# Patient Record
Sex: FEMALE | Race: WHITE | NOT HISPANIC OR LATINO
[De-identification: names, ages, dates, MRNs, and addresses within clinical notes are randomized per-mention and may not be internally consistent; named-entity substitution may affect disease eponyms.]

---

## 2019-01-03 PROBLEM — Z00.00 ENCOUNTER FOR PREVENTIVE HEALTH EXAMINATION: Status: ACTIVE | Noted: 2019-01-03

## 2019-02-12 ENCOUNTER — APPOINTMENT (OUTPATIENT)
Dept: ORTHOPEDIC SURGERY | Facility: CLINIC | Age: 64
End: 2019-02-12
Payer: COMMERCIAL

## 2019-02-12 VITALS
BODY MASS INDEX: 27.48 KG/M2 | DIASTOLIC BLOOD PRESSURE: 87 MMHG | WEIGHT: 171 LBS | HEART RATE: 70 BPM | HEIGHT: 66 IN | SYSTOLIC BLOOD PRESSURE: 136 MMHG

## 2019-02-12 DIAGNOSIS — Z86.79 PERSONAL HISTORY OF OTHER DISEASES OF THE CIRCULATORY SYSTEM: ICD-10-CM

## 2019-02-12 DIAGNOSIS — Z80.9 FAMILY HISTORY OF MALIGNANT NEOPLASM, UNSPECIFIED: ICD-10-CM

## 2019-02-12 DIAGNOSIS — Z78.9 OTHER SPECIFIED HEALTH STATUS: ICD-10-CM

## 2019-02-12 DIAGNOSIS — Z86.39 PERSONAL HISTORY OF OTHER ENDOCRINE, NUTRITIONAL AND METABOLIC DISEASE: ICD-10-CM

## 2019-02-12 DIAGNOSIS — Z87.39 PERSONAL HISTORY OF OTHER DISEASES OF THE MUSCULOSKELETAL SYSTEM AND CONNECTIVE TISSUE: ICD-10-CM

## 2019-02-12 DIAGNOSIS — M16.11 UNILATERAL PRIMARY OSTEOARTHRITIS, RIGHT HIP: ICD-10-CM

## 2019-02-12 PROCEDURE — 99203 OFFICE O/P NEW LOW 30 MIN: CPT

## 2019-02-12 PROCEDURE — 73502 X-RAY EXAM HIP UNI 2-3 VIEWS: CPT

## 2019-04-09 ENCOUNTER — OUTPATIENT (OUTPATIENT)
Dept: OUTPATIENT SERVICES | Facility: HOSPITAL | Age: 64
LOS: 1 days | End: 2019-04-09
Payer: COMMERCIAL

## 2019-04-09 VITALS
DIASTOLIC BLOOD PRESSURE: 84 MMHG | RESPIRATION RATE: 14 BRPM | SYSTOLIC BLOOD PRESSURE: 126 MMHG | HEART RATE: 70 BPM | TEMPERATURE: 98 F | OXYGEN SATURATION: 99 % | WEIGHT: 167.55 LBS | HEIGHT: 66 IN

## 2019-04-09 DIAGNOSIS — Z98.51 TUBAL LIGATION STATUS: Chronic | ICD-10-CM

## 2019-04-09 DIAGNOSIS — Z91.09 OTHER ALLERGY STATUS, OTHER THAN TO DRUGS AND BIOLOGICAL SUBSTANCES: ICD-10-CM

## 2019-04-09 DIAGNOSIS — Z01.818 ENCOUNTER FOR OTHER PREPROCEDURAL EXAMINATION: ICD-10-CM

## 2019-04-09 DIAGNOSIS — M16.11 UNILATERAL PRIMARY OSTEOARTHRITIS, RIGHT HIP: ICD-10-CM

## 2019-04-09 DIAGNOSIS — M19.90 UNSPECIFIED OSTEOARTHRITIS, UNSPECIFIED SITE: ICD-10-CM

## 2019-04-09 LAB
ALBUMIN SERPL ELPH-MCNC: 4.1 G/DL — SIGNIFICANT CHANGE UP (ref 3.3–5)
ALP SERPL-CCNC: 59 U/L — SIGNIFICANT CHANGE UP (ref 30–120)
ALT FLD-CCNC: 22 U/L DA — SIGNIFICANT CHANGE UP (ref 10–60)
ANION GAP SERPL CALC-SCNC: 13 MMOL/L — SIGNIFICANT CHANGE UP (ref 5–17)
APTT BLD: 32.7 SEC — SIGNIFICANT CHANGE UP (ref 28.5–37)
AST SERPL-CCNC: 26 U/L — SIGNIFICANT CHANGE UP (ref 10–40)
BILIRUB SERPL-MCNC: 0.6 MG/DL — SIGNIFICANT CHANGE UP (ref 0.2–1.2)
BLD GP AB SCN SERPL QL: SIGNIFICANT CHANGE UP
BUN SERPL-MCNC: 26 MG/DL — HIGH (ref 7–23)
CALCIUM SERPL-MCNC: 9.3 MG/DL — SIGNIFICANT CHANGE UP (ref 8.4–10.5)
CHLORIDE SERPL-SCNC: 104 MMOL/L — SIGNIFICANT CHANGE UP (ref 96–108)
CO2 SERPL-SCNC: 28 MMOL/L — SIGNIFICANT CHANGE UP (ref 22–31)
CREAT SERPL-MCNC: 0.88 MG/DL — SIGNIFICANT CHANGE UP (ref 0.5–1.3)
GLUCOSE SERPL-MCNC: 96 MG/DL — SIGNIFICANT CHANGE UP (ref 70–99)
HCT VFR BLD CALC: 40.9 % — SIGNIFICANT CHANGE UP (ref 34.5–45)
HGB BLD-MCNC: 13.1 G/DL — SIGNIFICANT CHANGE UP (ref 11.5–15.5)
INR BLD: 1 RATIO — SIGNIFICANT CHANGE UP (ref 0.88–1.16)
MCHC RBC-ENTMCNC: 27.5 PG — SIGNIFICANT CHANGE UP (ref 27–34)
MCHC RBC-ENTMCNC: 32 GM/DL — SIGNIFICANT CHANGE UP (ref 32–36)
MCV RBC AUTO: 85.7 FL — SIGNIFICANT CHANGE UP (ref 80–100)
MRSA PCR RESULT.: SIGNIFICANT CHANGE UP
NRBC # BLD: 0 /100 WBCS — SIGNIFICANT CHANGE UP (ref 0–0)
PLATELET # BLD AUTO: 327 K/UL — SIGNIFICANT CHANGE UP (ref 150–400)
POTASSIUM SERPL-MCNC: 4.3 MMOL/L — SIGNIFICANT CHANGE UP (ref 3.5–5.3)
POTASSIUM SERPL-SCNC: 4.3 MMOL/L — SIGNIFICANT CHANGE UP (ref 3.5–5.3)
PROT SERPL-MCNC: 7.6 G/DL — SIGNIFICANT CHANGE UP (ref 6–8.3)
PROTHROM AB SERPL-ACNC: 10.9 SEC — SIGNIFICANT CHANGE UP (ref 10–12.9)
RBC # BLD: 4.77 M/UL — SIGNIFICANT CHANGE UP (ref 3.8–5.2)
RBC # FLD: 14.2 % — SIGNIFICANT CHANGE UP (ref 10.3–14.5)
S AUREUS DNA NOSE QL NAA+PROBE: SIGNIFICANT CHANGE UP
SODIUM SERPL-SCNC: 145 MMOL/L — SIGNIFICANT CHANGE UP (ref 135–145)
WBC # BLD: 6.89 K/UL — SIGNIFICANT CHANGE UP (ref 3.8–10.5)
WBC # FLD AUTO: 6.89 K/UL — SIGNIFICANT CHANGE UP (ref 3.8–10.5)

## 2019-04-09 PROCEDURE — 86900 BLOOD TYPING SEROLOGIC ABO: CPT

## 2019-04-09 PROCEDURE — 85730 THROMBOPLASTIN TIME PARTIAL: CPT

## 2019-04-09 PROCEDURE — 80053 COMPREHEN METABOLIC PANEL: CPT

## 2019-04-09 PROCEDURE — 85027 COMPLETE CBC AUTOMATED: CPT

## 2019-04-09 PROCEDURE — 85610 PROTHROMBIN TIME: CPT

## 2019-04-09 PROCEDURE — G0463: CPT

## 2019-04-09 PROCEDURE — 87641 MR-STAPH DNA AMP PROBE: CPT

## 2019-04-09 PROCEDURE — 86850 RBC ANTIBODY SCREEN: CPT

## 2019-04-09 PROCEDURE — 86901 BLOOD TYPING SEROLOGIC RH(D): CPT

## 2019-04-09 PROCEDURE — 36415 COLL VENOUS BLD VENIPUNCTURE: CPT

## 2019-04-09 PROCEDURE — 87640 STAPH A DNA AMP PROBE: CPT

## 2019-04-09 NOTE — H&P PST ADULT - NSICDXPASTMEDICALHX_GEN_ALL_CORE_FT
PAST MEDICAL HISTORY:  Essential hypertension     Hypothyroid     Osteoarthritis PAST MEDICAL HISTORY:  Essential hypertension     Hypothyroid     Osteoarthritis     Thyroid nodule Right

## 2019-04-09 NOTE — H&P PST ADULT - HISTORY OF PRESENT ILLNESS
This is a 62 y/o female who presents with longstanding history of progressively worsening right hip pain This is a 62 y/o female who presents with longstanding history of progressively worsening right hip pain with radiation to inner groin to right knee and limping  , Pain is  intermittent and worse pain with walking . Failed conservative measures Now presents to PST for right hip replacement on 4/29/19

## 2019-04-09 NOTE — H&P PST ADULT - OTHER CARE PROVIDERS
Dr Sheikh Jamul cardiology Dr Sheikh [place] cardiology  Dr Sheikh [place] cardiology , endocrinologist

## 2019-04-09 NOTE — H&P PST ADULT - NSICDXPROBLEM_GEN_ALL_CORE_FT
PROBLEM DIAGNOSES  Problem: H/O metal allergy  Assessment and Plan: Patient allergic to silver   left message for Gloria NP   notified OR rodriguez Yeimi     Problem: Osteoarthritis  Assessment and Plan: Right total hip replacement   Medical clearance   Cardiac clearance   Endocrinology clearance   Pre op instructions

## 2019-04-09 NOTE — H&P PST ADULT - RS GEN PE MLT RESP DETAILS PC
breath sounds equal/good air movement/no rhonchi/clear to auscultation bilaterally/respirations non-labored

## 2019-04-10 PROBLEM — E04.1 NONTOXIC SINGLE THYROID NODULE: Chronic | Status: ACTIVE | Noted: 2019-04-09

## 2019-04-25 RX ORDER — MAGNESIUM HYDROXIDE 400 MG/1
30 TABLET, CHEWABLE ORAL DAILY
Qty: 0 | Refills: 0 | Status: DISCONTINUED | OUTPATIENT
Start: 2019-04-29 | End: 2019-05-01

## 2019-04-25 RX ORDER — SENNA PLUS 8.6 MG/1
2 TABLET ORAL AT BEDTIME
Qty: 0 | Refills: 0 | Status: DISCONTINUED | OUTPATIENT
Start: 2019-04-29 | End: 2019-05-01

## 2019-04-25 RX ORDER — DOCUSATE SODIUM 100 MG
100 CAPSULE ORAL THREE TIMES A DAY
Qty: 0 | Refills: 0 | Status: DISCONTINUED | OUTPATIENT
Start: 2019-04-29 | End: 2019-05-01

## 2019-04-25 RX ORDER — SODIUM CHLORIDE 9 MG/ML
1000 INJECTION, SOLUTION INTRAVENOUS
Qty: 0 | Refills: 0 | Status: DISCONTINUED | OUTPATIENT
Start: 2019-04-29 | End: 2019-05-01

## 2019-04-25 RX ORDER — POLYETHYLENE GLYCOL 3350 17 G/17G
17 POWDER, FOR SOLUTION ORAL DAILY
Qty: 0 | Refills: 0 | Status: DISCONTINUED | OUTPATIENT
Start: 2019-04-29 | End: 2019-05-01

## 2019-04-25 RX ORDER — ONDANSETRON 8 MG/1
4 TABLET, FILM COATED ORAL EVERY 6 HOURS
Qty: 0 | Refills: 0 | Status: DISCONTINUED | OUTPATIENT
Start: 2019-04-29 | End: 2019-05-01

## 2019-04-25 RX ORDER — PANTOPRAZOLE SODIUM 20 MG/1
40 TABLET, DELAYED RELEASE ORAL
Qty: 0 | Refills: 0 | Status: DISCONTINUED | OUTPATIENT
Start: 2019-04-29 | End: 2019-05-01

## 2019-04-29 ENCOUNTER — INPATIENT (INPATIENT)
Facility: HOSPITAL | Age: 64
LOS: 1 days | Discharge: HOME | DRG: 470 | End: 2019-05-01
Attending: ORTHOPAEDIC SURGERY | Admitting: ORTHOPAEDIC SURGERY
Payer: COMMERCIAL

## 2019-04-29 ENCOUNTER — RESULT REVIEW (OUTPATIENT)
Age: 64
End: 2019-04-29

## 2019-04-29 ENCOUNTER — APPOINTMENT (OUTPATIENT)
Dept: ORTHOPEDIC SURGERY | Facility: HOSPITAL | Age: 64
End: 2019-04-29

## 2019-04-29 VITALS
SYSTOLIC BLOOD PRESSURE: 122 MMHG | WEIGHT: 167.99 LBS | TEMPERATURE: 99 F | DIASTOLIC BLOOD PRESSURE: 77 MMHG | HEIGHT: 67 IN | HEART RATE: 91 BPM | RESPIRATION RATE: 15 BRPM | OXYGEN SATURATION: 100 %

## 2019-04-29 DIAGNOSIS — Z98.51 TUBAL LIGATION STATUS: Chronic | ICD-10-CM

## 2019-04-29 DIAGNOSIS — E03.9 HYPOTHYROIDISM, UNSPECIFIED: ICD-10-CM

## 2019-04-29 DIAGNOSIS — I10 ESSENTIAL (PRIMARY) HYPERTENSION: ICD-10-CM

## 2019-04-29 DIAGNOSIS — M16.11 UNILATERAL PRIMARY OSTEOARTHRITIS, RIGHT HIP: ICD-10-CM

## 2019-04-29 PROBLEM — M19.90 UNSPECIFIED OSTEOARTHRITIS, UNSPECIFIED SITE: Chronic | Status: ACTIVE | Noted: 2019-04-09

## 2019-04-29 LAB
ANION GAP SERPL CALC-SCNC: 10 MMOL/L — SIGNIFICANT CHANGE UP (ref 5–17)
BUN SERPL-MCNC: 15 MG/DL — SIGNIFICANT CHANGE UP (ref 7–23)
CALCIUM SERPL-MCNC: 8.9 MG/DL — SIGNIFICANT CHANGE UP (ref 8.4–10.5)
CHLORIDE SERPL-SCNC: 100 MMOL/L — SIGNIFICANT CHANGE UP (ref 96–108)
CO2 SERPL-SCNC: 28 MMOL/L — SIGNIFICANT CHANGE UP (ref 22–31)
CREAT SERPL-MCNC: 0.8 MG/DL — SIGNIFICANT CHANGE UP (ref 0.5–1.3)
GLUCOSE SERPL-MCNC: 137 MG/DL — HIGH (ref 70–99)
HCT VFR BLD CALC: 37.3 % — SIGNIFICANT CHANGE UP (ref 34.5–45)
HGB BLD-MCNC: 12.2 G/DL — SIGNIFICANT CHANGE UP (ref 11.5–15.5)
MCHC RBC-ENTMCNC: 27.7 PG — SIGNIFICANT CHANGE UP (ref 27–34)
MCHC RBC-ENTMCNC: 32.7 GM/DL — SIGNIFICANT CHANGE UP (ref 32–36)
MCV RBC AUTO: 84.6 FL — SIGNIFICANT CHANGE UP (ref 80–100)
NRBC # BLD: 0 /100 WBCS — SIGNIFICANT CHANGE UP (ref 0–0)
PLATELET # BLD AUTO: 291 K/UL — SIGNIFICANT CHANGE UP (ref 150–400)
POTASSIUM SERPL-MCNC: 3.5 MMOL/L — SIGNIFICANT CHANGE UP (ref 3.5–5.3)
POTASSIUM SERPL-SCNC: 3.5 MMOL/L — SIGNIFICANT CHANGE UP (ref 3.5–5.3)
RBC # BLD: 4.41 M/UL — SIGNIFICANT CHANGE UP (ref 3.8–5.2)
RBC # FLD: 14 % — SIGNIFICANT CHANGE UP (ref 10.3–14.5)
SODIUM SERPL-SCNC: 138 MMOL/L — SIGNIFICANT CHANGE UP (ref 135–145)
WBC # BLD: 18.15 K/UL — HIGH (ref 3.8–10.5)
WBC # FLD AUTO: 18.15 K/UL — HIGH (ref 3.8–10.5)

## 2019-04-29 PROCEDURE — 99223 1ST HOSP IP/OBS HIGH 75: CPT

## 2019-04-29 PROCEDURE — 88305 TISSUE EXAM BY PATHOLOGIST: CPT | Mod: 26

## 2019-04-29 PROCEDURE — 27130 TOTAL HIP ARTHROPLASTY: CPT | Mod: RT

## 2019-04-29 PROCEDURE — 88311 DECALCIFY TISSUE: CPT | Mod: 26

## 2019-04-29 PROCEDURE — 73502 X-RAY EXAM HIP UNI 2-3 VIEWS: CPT | Mod: 26,RT

## 2019-04-29 PROCEDURE — 27130 TOTAL HIP ARTHROPLASTY: CPT | Mod: 82,RT

## 2019-04-29 RX ORDER — CEFAZOLIN SODIUM 1 G
2000 VIAL (EA) INJECTION ONCE
Qty: 0 | Refills: 0 | Status: COMPLETED | OUTPATIENT
Start: 2019-04-29 | End: 2019-04-29

## 2019-04-29 RX ORDER — OXYCODONE HYDROCHLORIDE 5 MG/1
5 TABLET ORAL
Qty: 0 | Refills: 0 | Status: DISCONTINUED | OUTPATIENT
Start: 2019-04-29 | End: 2019-05-01

## 2019-04-29 RX ORDER — ACETAMINOPHEN 500 MG
1000 TABLET ORAL EVERY 6 HOURS
Qty: 0 | Refills: 0 | Status: COMPLETED | OUTPATIENT
Start: 2019-04-29 | End: 2019-04-29

## 2019-04-29 RX ORDER — ASPIRIN/CALCIUM CARB/MAGNESIUM 324 MG
81 TABLET ORAL EVERY 12 HOURS
Qty: 0 | Refills: 0 | Status: DISCONTINUED | OUTPATIENT
Start: 2019-04-30 | End: 2019-05-01

## 2019-04-29 RX ORDER — CEFAZOLIN SODIUM 1 G
2000 VIAL (EA) INJECTION EVERY 8 HOURS
Qty: 0 | Refills: 0 | Status: COMPLETED | OUTPATIENT
Start: 2019-04-29 | End: 2019-04-30

## 2019-04-29 RX ORDER — SODIUM CHLORIDE 9 MG/ML
1000 INJECTION, SOLUTION INTRAVENOUS
Qty: 0 | Refills: 0 | Status: DISCONTINUED | OUTPATIENT
Start: 2019-04-29 | End: 2019-04-29

## 2019-04-29 RX ORDER — TRANEXAMIC ACID 100 MG/ML
1000 INJECTION, SOLUTION INTRAVENOUS ONCE
Qty: 0 | Refills: 0 | Status: COMPLETED | OUTPATIENT
Start: 2019-04-29 | End: 2019-04-29

## 2019-04-29 RX ORDER — APREPITANT 80 MG/1
40 CAPSULE ORAL ONCE
Qty: 0 | Refills: 0 | Status: COMPLETED | OUTPATIENT
Start: 2019-04-29 | End: 2019-04-29

## 2019-04-29 RX ORDER — HYDROMORPHONE HYDROCHLORIDE 2 MG/ML
0.5 INJECTION INTRAMUSCULAR; INTRAVENOUS; SUBCUTANEOUS
Qty: 0 | Refills: 0 | Status: DISCONTINUED | OUTPATIENT
Start: 2019-04-29 | End: 2019-04-29

## 2019-04-29 RX ORDER — HYDROMORPHONE HYDROCHLORIDE 2 MG/ML
0.5 INJECTION INTRAMUSCULAR; INTRAVENOUS; SUBCUTANEOUS
Qty: 0 | Refills: 0 | Status: DISCONTINUED | OUTPATIENT
Start: 2019-04-29 | End: 2019-05-01

## 2019-04-29 RX ORDER — CELECOXIB 200 MG/1
200 CAPSULE ORAL
Qty: 0 | Refills: 0 | Status: DISCONTINUED | OUTPATIENT
Start: 2019-04-29 | End: 2019-05-01

## 2019-04-29 RX ORDER — ACETAMINOPHEN 500 MG
1000 TABLET ORAL EVERY 8 HOURS
Qty: 0 | Refills: 0 | Status: DISCONTINUED | OUTPATIENT
Start: 2019-04-30 | End: 2019-05-01

## 2019-04-29 RX ORDER — CHLORHEXIDINE GLUCONATE 213 G/1000ML
1 SOLUTION TOPICAL ONCE
Qty: 0 | Refills: 0 | Status: COMPLETED | OUTPATIENT
Start: 2019-04-29 | End: 2019-04-29

## 2019-04-29 RX ORDER — ACETAMINOPHEN 500 MG
1000 TABLET ORAL ONCE
Qty: 0 | Refills: 0 | Status: COMPLETED | OUTPATIENT
Start: 2019-04-29 | End: 2019-04-29

## 2019-04-29 RX ORDER — ONDANSETRON 8 MG/1
4 TABLET, FILM COATED ORAL ONCE
Qty: 0 | Refills: 0 | Status: DISCONTINUED | OUTPATIENT
Start: 2019-04-29 | End: 2019-04-29

## 2019-04-29 RX ORDER — LOSARTAN POTASSIUM 100 MG/1
100 TABLET, FILM COATED ORAL DAILY
Qty: 0 | Refills: 0 | Status: DISCONTINUED | OUTPATIENT
Start: 2019-05-01 | End: 2019-05-01

## 2019-04-29 RX ORDER — LEVOTHYROXINE SODIUM 125 MCG
50 TABLET ORAL DAILY
Qty: 0 | Refills: 0 | Status: DISCONTINUED | OUTPATIENT
Start: 2019-04-29 | End: 2019-05-01

## 2019-04-29 RX ORDER — OXYCODONE HYDROCHLORIDE 5 MG/1
10 TABLET ORAL
Qty: 0 | Refills: 0 | Status: DISCONTINUED | OUTPATIENT
Start: 2019-04-29 | End: 2019-05-01

## 2019-04-29 RX ADMIN — APREPITANT 40 MILLIGRAM(S): 80 CAPSULE ORAL at 08:32

## 2019-04-29 RX ADMIN — Medication 100 MILLIGRAM(S): at 21:28

## 2019-04-29 RX ADMIN — CELECOXIB 200 MILLIGRAM(S): 200 CAPSULE ORAL at 21:28

## 2019-04-29 RX ADMIN — OXYCODONE HYDROCHLORIDE 10 MILLIGRAM(S): 5 TABLET ORAL at 21:28

## 2019-04-29 RX ADMIN — Medication 1000 MILLIGRAM(S): at 17:55

## 2019-04-29 RX ADMIN — SODIUM CHLORIDE 75 MILLILITER(S): 9 INJECTION, SOLUTION INTRAVENOUS at 12:42

## 2019-04-29 RX ADMIN — OXYCODONE HYDROCHLORIDE 5 MILLIGRAM(S): 5 TABLET ORAL at 18:28

## 2019-04-29 RX ADMIN — Medication 100 MILLIGRAM(S): at 18:01

## 2019-04-29 RX ADMIN — Medication 400 MILLIGRAM(S): at 23:00

## 2019-04-29 RX ADMIN — HYDROMORPHONE HYDROCHLORIDE 0.5 MILLIGRAM(S): 2 INJECTION INTRAMUSCULAR; INTRAVENOUS; SUBCUTANEOUS at 14:00

## 2019-04-29 RX ADMIN — SENNA PLUS 2 TABLET(S): 8.6 TABLET ORAL at 21:28

## 2019-04-29 RX ADMIN — Medication 400 MILLIGRAM(S): at 17:23

## 2019-04-29 RX ADMIN — CHLORHEXIDINE GLUCONATE 1 APPLICATION(S): 213 SOLUTION TOPICAL at 08:33

## 2019-04-29 RX ADMIN — OXYCODONE HYDROCHLORIDE 5 MILLIGRAM(S): 5 TABLET ORAL at 18:01

## 2019-04-29 RX ADMIN — OXYCODONE HYDROCHLORIDE 10 MILLIGRAM(S): 5 TABLET ORAL at 22:00

## 2019-04-29 RX ADMIN — HYDROMORPHONE HYDROCHLORIDE 0.5 MILLIGRAM(S): 2 INJECTION INTRAMUSCULAR; INTRAVENOUS; SUBCUTANEOUS at 13:45

## 2019-04-29 NOTE — PHYSICAL THERAPY INITIAL EVALUATION ADULT - PLANNED THERAPY INTERVENTIONS, PT EVAL
bed mobility training/transfer training/gait training transfer training/bed mobility training/gait training/Stair Training

## 2019-04-29 NOTE — PHYSICAL THERAPY INITIAL EVALUATION ADULT - DID THE PATIENT HAVE SURGERY?
s/p Right total hip replacement (postero-lateral approach)/yes yes/s/p Right total hip replacement (posterior approach)

## 2019-04-29 NOTE — CONSULT NOTE ADULT - PROBLEM SELECTOR RECOMMENDATION 9
Pain Management: acceptable- continue current care Tylenol ATC/Celebrex ATC/ Oxycodone PRN  Continue PT/OT  DVT proph: [ x ] low risk - Aspirin  [  ] high risk -Lovenox [  ] high risk - Eliquis [  ] other:_________  DC plan:  [ x ] Home with HC  [  ] Rehab   [  ] TBD  [  ]other:___________

## 2019-04-29 NOTE — OCCUPATIONAL THERAPY INITIAL EVALUATION ADULT - PLANNED THERAPY INTERVENTIONS, OT EVAL
Patient will recall/adhere to  3/3 Total Hip Precautions 100% of the time within 2-4 sessions/transfer training/IADL retraining/ADL retraining

## 2019-04-29 NOTE — OCCUPATIONAL THERAPY INITIAL EVALUATION ADULT - ADDITIONAL COMMENTS
1 PAOLA no HR, 14 steps 1 HR to bedroom/2nd level. + stall shower with grab bars 1st level, + 2 RTS (1 with armrests), commode, RW, SAC, hip kit

## 2019-04-29 NOTE — OCCUPATIONAL THERAPY INITIAL EVALUATION ADULT - IADL RETRAINING, OT EVAL
Patient will increase standing tolerance to 3-5 minutes for grooming at the sink within 2-4 sessions

## 2019-04-29 NOTE — PHYSICAL THERAPY INITIAL EVALUATION ADULT - ACTIVE RANGE OF MOTION EXAMINATION, REHAB EVAL
bilateral upper extremity Active ROM was WFL (within functional limits)/Right LE with total hip precautions/Left LE Active ROM was WFL (within functional limits)

## 2019-04-29 NOTE — PHYSICAL THERAPY INITIAL EVALUATION ADULT - GAIT TRAINING, PT EVAL
ambulate 150 feet with supervision in 3-5 sessions ambulate 150 feet independently in 3-5 sessions. Pt to be able to negotiate 10 steps with SAC and supervision in 3-5 sessions. ambulate 150 feet with rolling walker independently in 3-5 sessions. Pt will be able to negotiate 10 steps with SAC and supervision in 3-5 sessions.

## 2019-04-29 NOTE — PHYSICAL THERAPY INITIAL EVALUATION ADULT - TRANSFER TRAINING, PT EVAL
sit to stand with rolling walker with supervision in 3-5 sessions sit to stand with rolling walker independently in 3-5 sessions

## 2019-04-29 NOTE — CONSULT NOTE ADULT - SUBJECTIVE AND OBJECTIVE BOX
Patient is a 63y old  Female who presents with a chief complaint of     HPI: 63F presented with longstanding history of progressively worsening right hip pain with radiation to inner groin to right knee and limping. Pain is  intermittent and worse pain with walking . Failed conservative measures.  Now s/p right hip replacement on 4/29/19.  Now her pain is gnawing moderate pain in her right hip.  Patient was told by her cardiologist to take her meds normally today so she took her Losartan/HCTZ at 3pm.  She normally takes her BP meds at 6pm.   (patient has been educated to never take own meds without speaking with staff first.)    REVIEW OF SYSTEMS:  CONSTITUTIONAL: No fever, weight loss, or fatigue  EYES: No eye pain, visual disturbances, or discharge  ENMT:  No difficulty hearing, tinnitus, vertigo; No sinus or throat pain  NECK: No pain or stiffness  BREASTS: No pain, masses, or nipple discharge  RESPIRATORY: No cough, wheezing, chills or hemoptysis; No shortness of breath  CARDIOVASCULAR: No chest pain, palpitations, dizziness, or leg swelling  GASTROINTESTINAL: No abdominal or epigastric pain. No nausea, vomiting, or hematemesis; No diarrhea or constipation. No melena or hematochezia.  GENITOURINARY: No dysuria, frequency, hematuria, or incontinence  NEUROLOGICAL: No headaches, memory loss, loss of strength, numbness, or tremors  SKIN: No itching, burning, rashes, or lesions   LYMPH NODES: No enlarged glands  ENDOCRINE: No heat or cold intolerance; No hair loss  MUSCULOSKELETAL: No muscle or back pain  PSYCHIATRIC: No depression, anxiety, mood swings, or difficulty sleeping  HEME/LYMPH: No easy bruising, or bleeding gums  ALLERGY AND IMMUNOLOGIC: No hives or eczema    PAST MEDICAL & SURGICAL HISTORY:  Thyroid nodule: Right  Osteoarthritis  Hypothyroid  Essential hypertension  H/O tubal ligation: 1986      SOCIAL HISTORY:  [facility]  [x ] Community  [ ] Substance abuse: none  [ ] Tobacco: quit 1986  [ ] Alcohol use: once a month or so.     Allergies  epinephrine (Other)  silver ;rash (Rash)    MEDICATIONS  (STANDING):  acetaminophen  IVPB .. 1000 milliGRAM(s) IV Intermittent every 6 hours  ceFAZolin   IVPB 2000 milliGRAM(s) IV Intermittent every 8 hours  celecoxib 200 milliGRAM(s) Oral two times a day  docusate sodium 100 milliGRAM(s) Oral three times a day  lactated ringers. 1000 milliLiter(s) (125 mL/Hr) IV Continuous <Continuous>  levothyroxine 50 MICROGram(s) Oral daily  pantoprazole    Tablet 40 milliGRAM(s) Oral before breakfast  senna 2 Tablet(s) Oral at bedtime    MEDICATIONS  (PRN):  aluminum hydroxide/magnesium hydroxide/simethicone Suspension 30 milliLiter(s) Oral four times a day PRN Indigestion  HYDROmorphone  Injectable 0.5 milliGRAM(s) IV Push every 3 hours PRN Severe Pain (7 - 10)  magnesium hydroxide Suspension 30 milliLiter(s) Oral daily PRN Constipation  ondansetron Injectable 4 milliGRAM(s) IV Push every 6 hours PRN Nausea and/or Vomiting  oxyCODONE    IR 5 milliGRAM(s) Oral every 3 hours PRN Mild Pain (1 - 3)  oxyCODONE    IR 10 milliGRAM(s) Oral every 3 hours PRN Moderate Pain (4 - 6)  polyethylene glycol 3350 17 Gram(s) Oral daily PRN Constipation      FAMILY HISTORY:  FH: lymphoma: father  FH: pancreatic cancer: mother      Vital Signs Last 24 Hrs  T(C): 36.7 (29 Apr 2019 14:45), Max: 37.2 (29 Apr 2019 08:29)  T(F): 98 (29 Apr 2019 14:45), Max: 99 (29 Apr 2019 08:29)  HR: 84 (29 Apr 2019 14:45) (71 - 91)  BP: 134/73 (29 Apr 2019 14:45) (122/77 - 135/80)  BP(mean): --  RR: 14 (29 Apr 2019 14:45) (13 - 21)  SpO2: 99% (29 Apr 2019 14:00) (99% - 100%)    PHYSICAL EXAM:    GENERAL: NAD, well-groomed, well-developed  HEAD:  Atraumatic, Normocephalic  EYES: EOMI, PERRLA, conjunctiva and sclera clear  ENMT:  Moist mucous membranes  NECK: Supple, No JVD  NERVOUS SYSTEM:  Alert & Oriented X3, Good concentration; Moving all 4 extremities; No gross sensory deficits  CHEST/LUNG: Clear to auscultation bilaterally; No rales, rhonchi, wheezing, or rubs  HEART: Regular rate and rhythm; No murmurs, rubs, or gallops  ABDOMEN: Soft, Nontender, Nondistended; Bowel sounds present  EXTREMITIES:  2+ Peripheral Pulses, No clubbing, cyanosis, or edema  LYMPH: No lymphadenopathy noted  /RECTAL: Not examined  BREAST: Not examined  SKIN: No rashes or lesions  INCISION: dressing dry and intact.     LABS:                        12.2   18.15 )-----------( 291      ( 29 Apr 2019 17:38 )             37.3           CAPILLARY BLOOD GLUCOSE          RADIOLOGY & ADDITIONAL STUDIES:    EKG: NSR (paper EKG done by cardio in the chart.)  Personally Reviewed:  [x ] YES     Imaging: right hip replacement  Personally Reviewed:  [x ] YES     Consultant(s) Notes Reviewed:  pre-op med and cardio clearance reviewed.     Care Discussed with Consultants/Other Providers:

## 2019-04-29 NOTE — PHYSICAL THERAPY INITIAL EVALUATION ADULT - ADDITIONAL COMMENTS
Pt lives in a house with her  and daughter. Pt has one step to enter house with no hand rail and 14 steps inside to get to bedroom with one handrail. Pt owns a rolling walker and a cane. Pt was previously independent with ambulation, transfers, and ADL's, using a SAC for ambulation.

## 2019-04-30 ENCOUNTER — TRANSCRIPTION ENCOUNTER (OUTPATIENT)
Age: 64
End: 2019-04-30

## 2019-04-30 LAB
ANION GAP SERPL CALC-SCNC: 8 MMOL/L — SIGNIFICANT CHANGE UP (ref 5–17)
BUN SERPL-MCNC: 16 MG/DL — SIGNIFICANT CHANGE UP (ref 7–23)
CALCIUM SERPL-MCNC: 8.5 MG/DL — SIGNIFICANT CHANGE UP (ref 8.4–10.5)
CHLORIDE SERPL-SCNC: 97 MMOL/L — SIGNIFICANT CHANGE UP (ref 96–108)
CO2 SERPL-SCNC: 29 MMOL/L — SIGNIFICANT CHANGE UP (ref 22–31)
CREAT SERPL-MCNC: 0.7 MG/DL — SIGNIFICANT CHANGE UP (ref 0.5–1.3)
GLUCOSE SERPL-MCNC: 112 MG/DL — HIGH (ref 70–99)
HCT VFR BLD CALC: 31.6 % — LOW (ref 34.5–45)
HGB BLD-MCNC: 10.6 G/DL — LOW (ref 11.5–15.5)
MCHC RBC-ENTMCNC: 28.6 PG — SIGNIFICANT CHANGE UP (ref 27–34)
MCHC RBC-ENTMCNC: 33.5 GM/DL — SIGNIFICANT CHANGE UP (ref 32–36)
MCV RBC AUTO: 85.2 FL — SIGNIFICANT CHANGE UP (ref 80–100)
NRBC # BLD: 0 /100 WBCS — SIGNIFICANT CHANGE UP (ref 0–0)
PLATELET # BLD AUTO: 232 K/UL — SIGNIFICANT CHANGE UP (ref 150–400)
POTASSIUM SERPL-MCNC: 4 MMOL/L — SIGNIFICANT CHANGE UP (ref 3.5–5.3)
POTASSIUM SERPL-SCNC: 4 MMOL/L — SIGNIFICANT CHANGE UP (ref 3.5–5.3)
RBC # BLD: 3.71 M/UL — LOW (ref 3.8–5.2)
RBC # FLD: 13.9 % — SIGNIFICANT CHANGE UP (ref 10.3–14.5)
SODIUM SERPL-SCNC: 134 MMOL/L — LOW (ref 135–145)
WBC # BLD: 14.93 K/UL — HIGH (ref 3.8–10.5)
WBC # FLD AUTO: 14.93 K/UL — HIGH (ref 3.8–10.5)

## 2019-04-30 PROCEDURE — 99232 SBSQ HOSP IP/OBS MODERATE 35: CPT

## 2019-04-30 RX ORDER — SENNA PLUS 8.6 MG/1
2 TABLET ORAL
Qty: 0 | Refills: 0 | COMMUNITY
Start: 2019-04-30

## 2019-04-30 RX ORDER — LEVOTHYROXINE SODIUM 125 MCG
1 TABLET ORAL
Qty: 0 | Refills: 0 | COMMUNITY

## 2019-04-30 RX ORDER — ACETAMINOPHEN 500 MG
2 TABLET ORAL
Qty: 0 | Refills: 0 | COMMUNITY
Start: 2019-04-30

## 2019-04-30 RX ORDER — CHOLECALCIFEROL (VITAMIN D3) 125 MCG
1 CAPSULE ORAL
Qty: 0 | Refills: 0 | COMMUNITY

## 2019-04-30 RX ORDER — LOSARTAN/HYDROCHLOROTHIAZIDE 100MG-25MG
1 TABLET ORAL
Qty: 0 | Refills: 0 | COMMUNITY

## 2019-04-30 RX ORDER — CELECOXIB 200 MG/1
1 CAPSULE ORAL
Qty: 60 | Refills: 0 | OUTPATIENT
Start: 2019-04-30 | End: 2019-05-29

## 2019-04-30 RX ORDER — ASPIRIN/CALCIUM CARB/MAGNESIUM 324 MG
1 TABLET ORAL
Qty: 82 | Refills: 0 | OUTPATIENT
Start: 2019-04-30 | End: 2019-06-09

## 2019-04-30 RX ORDER — POTASSIUM CHLORIDE 20 MEQ
0 PACKET (EA) ORAL
Qty: 0 | Refills: 0 | COMMUNITY

## 2019-04-30 RX ORDER — POLYETHYLENE GLYCOL 3350 17 G/17G
17 POWDER, FOR SOLUTION ORAL
Qty: 0 | Refills: 0 | COMMUNITY
Start: 2019-04-30

## 2019-04-30 RX ORDER — DOCUSATE SODIUM 100 MG
1 CAPSULE ORAL
Qty: 0 | Refills: 0 | COMMUNITY
Start: 2019-04-30

## 2019-04-30 RX ORDER — OXYCODONE HYDROCHLORIDE 5 MG/1
1 TABLET ORAL
Qty: 42 | Refills: 0 | OUTPATIENT
Start: 2019-04-30 | End: 2019-05-06

## 2019-04-30 RX ORDER — PANTOPRAZOLE SODIUM 20 MG/1
1 TABLET, DELAYED RELEASE ORAL
Qty: 30 | Refills: 1 | OUTPATIENT
Start: 2019-04-30 | End: 2019-06-28

## 2019-04-30 RX ADMIN — Medication 1000 MILLIGRAM(S): at 11:24

## 2019-04-30 RX ADMIN — OXYCODONE HYDROCHLORIDE 5 MILLIGRAM(S): 5 TABLET ORAL at 13:06

## 2019-04-30 RX ADMIN — CELECOXIB 200 MILLIGRAM(S): 200 CAPSULE ORAL at 21:28

## 2019-04-30 RX ADMIN — OXYCODONE HYDROCHLORIDE 5 MILLIGRAM(S): 5 TABLET ORAL at 09:41

## 2019-04-30 RX ADMIN — Medication 100 MILLIGRAM(S): at 02:12

## 2019-04-30 RX ADMIN — OXYCODONE HYDROCHLORIDE 10 MILLIGRAM(S): 5 TABLET ORAL at 03:18

## 2019-04-30 RX ADMIN — Medication 400 MILLIGRAM(S): at 05:17

## 2019-04-30 RX ADMIN — SODIUM CHLORIDE 125 MILLILITER(S): 9 INJECTION, SOLUTION INTRAVENOUS at 00:55

## 2019-04-30 RX ADMIN — Medication 50 MICROGRAM(S): at 06:12

## 2019-04-30 RX ADMIN — OXYCODONE HYDROCHLORIDE 5 MILLIGRAM(S): 5 TABLET ORAL at 09:01

## 2019-04-30 RX ADMIN — PANTOPRAZOLE SODIUM 40 MILLIGRAM(S): 20 TABLET, DELAYED RELEASE ORAL at 05:19

## 2019-04-30 RX ADMIN — OXYCODONE HYDROCHLORIDE 10 MILLIGRAM(S): 5 TABLET ORAL at 19:10

## 2019-04-30 RX ADMIN — Medication 100 MILLIGRAM(S): at 15:29

## 2019-04-30 RX ADMIN — CELECOXIB 200 MILLIGRAM(S): 200 CAPSULE ORAL at 09:02

## 2019-04-30 RX ADMIN — Medication 400 MILLIGRAM(S): at 05:19

## 2019-04-30 RX ADMIN — CELECOXIB 200 MILLIGRAM(S): 200 CAPSULE ORAL at 09:01

## 2019-04-30 RX ADMIN — OXYCODONE HYDROCHLORIDE 10 MILLIGRAM(S): 5 TABLET ORAL at 19:40

## 2019-04-30 RX ADMIN — Medication 1000 MILLIGRAM(S): at 17:36

## 2019-04-30 RX ADMIN — Medication 100 MILLIGRAM(S): at 05:19

## 2019-04-30 RX ADMIN — Medication 81 MILLIGRAM(S): at 17:36

## 2019-04-30 RX ADMIN — Medication 1000 MILLIGRAM(S): at 17:37

## 2019-04-30 RX ADMIN — Medication 81 MILLIGRAM(S): at 05:19

## 2019-04-30 RX ADMIN — SENNA PLUS 2 TABLET(S): 8.6 TABLET ORAL at 21:29

## 2019-04-30 RX ADMIN — OXYCODONE HYDROCHLORIDE 5 MILLIGRAM(S): 5 TABLET ORAL at 12:33

## 2019-04-30 RX ADMIN — Medication 100 MILLIGRAM(S): at 21:29

## 2019-04-30 RX ADMIN — OXYCODONE HYDROCHLORIDE 10 MILLIGRAM(S): 5 TABLET ORAL at 03:50

## 2019-04-30 NOTE — DISCHARGE NOTE PROVIDER - NSDCFUADDINST_GEN_ALL_CORE_FT
For Constipation :   • Increase your water intake. Drink at least 8 glasses of water daily.  • Try adding fiber to your diet by eating fruits, vegetables and foods that are rich in grains.  • If you do experience constipation, you may take an over-the-counter stool softener/laxative such as Lexie Colace, Senekot or  Milk of Magnesia.

## 2019-04-30 NOTE — PROGRESS NOTE ADULT - PROBLEM SELECTOR PLAN 1
Pain Management: acceptable- continue current care Tylenol ATC/Celebrex ATC/ Oxycodone PRN  Continue PT/OT  DVT proph: [ x ] low risk - Aspirin    DC plan:  [ x ] Home with HC

## 2019-04-30 NOTE — DISCHARGE NOTE PROVIDER - NSDCACTIVITY_GEN_ALL_CORE
Do not drive or operate machinery/No heavy lifting/straining/Stairs allowed/Walking - Indoors allowed

## 2019-04-30 NOTE — DISCHARGE NOTE PROVIDER - CARE PROVIDER_API CALL
Jayme Diaz)  Orthopaedic Surgery  [address], [address]  [place] [address]  Phone: [phone]  Fax: [phone]  Follow Up Time:

## 2019-04-30 NOTE — DISCHARGE NOTE PROVIDER - HOSPITAL COURSE
ROSA KUMARI        Admitted on 04-29-19         63y y.o.  Female with history of Osteoarthritis of right hip        Surgery:  Right Total hip replacement        Orthopedic Surgeon:   Dr. PHILIPPE Diaz        Lexie-operative antibiotic:      ceFAZolin   IVPB:,             Consulted Services : Hospitalist, Physical Therapy, Occupational Therapy        Typical Physical & occupational therapy modalities post Total hip replacement     were performed including ambulation training, range of motion, ADL's, and transfers.         DVT prophylaxis:  aspirin enteric coated: 81 milliGRAM(s)             The patient had a clean appearing surgical incision with no sign of surgical site infections and had a stable neuro / vascular exam of the operated extremity.  After progression of mobility guided by the PT/ OT staff,  the patient was felt to benefit from further rehabilitative care for restoration to level of function. This was felt to best be accomplished at Home.        Discharge and Orthopedic Care instructions were delineated in the Discharge Plan and reviewed with the patient. All medications were delineated in the medication reconciliation tool and key points were reviewed with the patient.         This patient was deemed stable from an Orthopedic & medical standpoint for discharge today.    She will follow up with Dr. PHILIPPE Diaz for further Orthopedic care.

## 2019-04-30 NOTE — DISCHARGE NOTE PROVIDER - NSDCCPCAREPLAN_GEN_ALL_CORE_FT
PRINCIPAL DISCHARGE DIAGNOSIS  Diagnosis: Primary localized osteoarthritis of right hip  Assessment and Plan of Treatment: Physical Therapy /Occupational Therapy for: Ambulation, Transfers , Stairs, Range of motion, isometrics ADLs (activities of daily living)  TOTAL HIP PRECAUTIONS-Weight bearing as tolerated.  Continue abduction pillow while in bed, do not bend hip >90 degrees, sit in hip chair only.  Keep incision area clean and dry you may shower post operative day 5 if no drainage present.  Follow up in 2 weeks for suture/prineo removal.  Follow up with your primary care phsyician within 2-3 weeks of discharge home   Call your doctor if you experience:  • An increase in pain not controlled by pain medication or change in activity or  position.  • Temperature greater than 101° F.  • Redness, increased swelling or foul smelling drainage from or around the  incision.  • Numbness, tingling or a change in color or temperature of the operative leg.  • Call your doctor immediately if you experience chest pain, shortness of breath or calf pain.  For Constipation :   • Increase your water intake. Drink at least 8 glasses of water daily.  • Try adding fiber to your diet by eating fruits, vegetables and foods that are rich in grains.  • If you do experience constipation, you may take an over-the-counter stool softener/laxative such as Colace, Senokot or Milk of Magnesia.

## 2019-05-01 ENCOUNTER — TRANSCRIPTION ENCOUNTER (OUTPATIENT)
Age: 64
End: 2019-05-01

## 2019-05-01 VITALS
SYSTOLIC BLOOD PRESSURE: 100 MMHG | TEMPERATURE: 98 F | HEART RATE: 74 BPM | RESPIRATION RATE: 16 BRPM | OXYGEN SATURATION: 98 % | DIASTOLIC BLOOD PRESSURE: 68 MMHG

## 2019-05-01 LAB
ANION GAP SERPL CALC-SCNC: 6 MMOL/L — SIGNIFICANT CHANGE UP (ref 5–17)
BUN SERPL-MCNC: 16 MG/DL — SIGNIFICANT CHANGE UP (ref 7–23)
CALCIUM SERPL-MCNC: 8.3 MG/DL — LOW (ref 8.4–10.5)
CHLORIDE SERPL-SCNC: 100 MMOL/L — SIGNIFICANT CHANGE UP (ref 96–108)
CO2 SERPL-SCNC: 31 MMOL/L — SIGNIFICANT CHANGE UP (ref 22–31)
CREAT SERPL-MCNC: 0.72 MG/DL — SIGNIFICANT CHANGE UP (ref 0.5–1.3)
GLUCOSE SERPL-MCNC: 99 MG/DL — SIGNIFICANT CHANGE UP (ref 70–99)
HCT VFR BLD CALC: 32.6 % — LOW (ref 34.5–45)
HGB BLD-MCNC: 11.1 G/DL — LOW (ref 11.5–15.5)
MCHC RBC-ENTMCNC: 28.7 PG — SIGNIFICANT CHANGE UP (ref 27–34)
MCHC RBC-ENTMCNC: 34 GM/DL — SIGNIFICANT CHANGE UP (ref 32–36)
MCV RBC AUTO: 84.2 FL — SIGNIFICANT CHANGE UP (ref 80–100)
NRBC # BLD: 0 /100 WBCS — SIGNIFICANT CHANGE UP (ref 0–0)
PLATELET # BLD AUTO: 239 K/UL — SIGNIFICANT CHANGE UP (ref 150–400)
POTASSIUM SERPL-MCNC: 3.6 MMOL/L — SIGNIFICANT CHANGE UP (ref 3.5–5.3)
POTASSIUM SERPL-SCNC: 3.6 MMOL/L — SIGNIFICANT CHANGE UP (ref 3.5–5.3)
RBC # BLD: 3.87 M/UL — SIGNIFICANT CHANGE UP (ref 3.8–5.2)
RBC # FLD: 13.8 % — SIGNIFICANT CHANGE UP (ref 10.3–14.5)
SODIUM SERPL-SCNC: 137 MMOL/L — SIGNIFICANT CHANGE UP (ref 135–145)
WBC # BLD: 11.05 K/UL — HIGH (ref 3.8–10.5)
WBC # FLD AUTO: 11.05 K/UL — HIGH (ref 3.8–10.5)

## 2019-05-01 PROCEDURE — C1776: CPT

## 2019-05-01 PROCEDURE — 80048 BASIC METABOLIC PNL TOTAL CA: CPT

## 2019-05-01 PROCEDURE — 99239 HOSP IP/OBS DSCHRG MGMT >30: CPT

## 2019-05-01 PROCEDURE — 97161 PT EVAL LOW COMPLEX 20 MIN: CPT

## 2019-05-01 PROCEDURE — 73502 X-RAY EXAM HIP UNI 2-3 VIEWS: CPT

## 2019-05-01 PROCEDURE — 97165 OT EVAL LOW COMPLEX 30 MIN: CPT

## 2019-05-01 PROCEDURE — 97530 THERAPEUTIC ACTIVITIES: CPT

## 2019-05-01 PROCEDURE — 99238 HOSP IP/OBS DSCHRG MGMT 30/<: CPT

## 2019-05-01 PROCEDURE — 88311 DECALCIFY TISSUE: CPT

## 2019-05-01 PROCEDURE — 36415 COLL VENOUS BLD VENIPUNCTURE: CPT

## 2019-05-01 PROCEDURE — 88305 TISSUE EXAM BY PATHOLOGIST: CPT

## 2019-05-01 PROCEDURE — 97110 THERAPEUTIC EXERCISES: CPT

## 2019-05-01 PROCEDURE — 97535 SELF CARE MNGMENT TRAINING: CPT

## 2019-05-01 PROCEDURE — C1713: CPT

## 2019-05-01 PROCEDURE — 97116 GAIT TRAINING THERAPY: CPT

## 2019-05-01 PROCEDURE — 85027 COMPLETE CBC AUTOMATED: CPT

## 2019-05-01 RX ADMIN — OXYCODONE HYDROCHLORIDE 10 MILLIGRAM(S): 5 TABLET ORAL at 02:01

## 2019-05-01 RX ADMIN — Medication 1000 MILLIGRAM(S): at 11:44

## 2019-05-01 RX ADMIN — Medication 50 MICROGRAM(S): at 05:29

## 2019-05-01 RX ADMIN — OXYCODONE HYDROCHLORIDE 10 MILLIGRAM(S): 5 TABLET ORAL at 02:34

## 2019-05-01 RX ADMIN — OXYCODONE HYDROCHLORIDE 5 MILLIGRAM(S): 5 TABLET ORAL at 05:30

## 2019-05-01 RX ADMIN — Medication 100 MILLIGRAM(S): at 05:30

## 2019-05-01 RX ADMIN — CELECOXIB 200 MILLIGRAM(S): 200 CAPSULE ORAL at 09:01

## 2019-05-01 RX ADMIN — OXYCODONE HYDROCHLORIDE 5 MILLIGRAM(S): 5 TABLET ORAL at 12:50

## 2019-05-01 RX ADMIN — OXYCODONE HYDROCHLORIDE 5 MILLIGRAM(S): 5 TABLET ORAL at 13:22

## 2019-05-01 RX ADMIN — CELECOXIB 200 MILLIGRAM(S): 200 CAPSULE ORAL at 08:59

## 2019-05-01 RX ADMIN — OXYCODONE HYDROCHLORIDE 5 MILLIGRAM(S): 5 TABLET ORAL at 08:59

## 2019-05-01 RX ADMIN — Medication 81 MILLIGRAM(S): at 05:30

## 2019-05-01 RX ADMIN — PANTOPRAZOLE SODIUM 40 MILLIGRAM(S): 20 TABLET, DELAYED RELEASE ORAL at 05:29

## 2019-05-01 RX ADMIN — OXYCODONE HYDROCHLORIDE 5 MILLIGRAM(S): 5 TABLET ORAL at 06:00

## 2019-05-01 RX ADMIN — OXYCODONE HYDROCHLORIDE 5 MILLIGRAM(S): 5 TABLET ORAL at 09:45

## 2019-05-01 NOTE — PROGRESS NOTE ADULT - PROBLEM SELECTOR PLAN 1
Pain Management: acceptable- continue current care Tylenol ATC/Celebrex ATC/ Oxycodone PRN  Continue PT/OT  DVT proph: [ x ] low risk - Aspirin    DC plan:  [ x ] Home with HC - planned for today if cleared by PT

## 2019-05-01 NOTE — PROGRESS NOTE ADULT - SUBJECTIVE AND OBJECTIVE BOX
Discharge medication calendar:  Aspirin EC 81mg q12h x 6 weeks  APAP 1000mg q8h x 2-3 weeks  Celecoxib 200mg q12h x 21 days  Pantoprazole 40mg QAM x 6 weeks  Narcotic PRN  Docusate 100mg TID while taking narcotic  Miralax, Senna, or Bisacodyl PRN for treatment of constipation
ORTHOPEDIC PA PROGRESS NOTE  ROSA KUMARI      63y Female                                                                                                                               POD #2        STATUS POST:               Pre-Op Dx: Osteoarthritis of right hip    Post-Op Dx:  Osteoarthritis of right hip    Procedure: Total hip replacement                                              Patient comfortable pain controlled.  Voiding urine passing gas and tolerating p.o diet.  No complaints  Pain (0-10):   Current Pain Management:  [ ] PCA   [ ] Po Analgesics [ ] IM /IV Anagesics     T(F): 98.2  HR: 76  BP: 119/83  RR: 14  SpO2: 98%                        10.6   14.93 )-----------( 232      ( 30 Apr 2019 07:44 )             31.6                     04-30    134<L>  |  97  |  16  ----------------------------<  112<H>  4.0   |  29  |  0.70    Ca    8.5      30 Apr 2019 07:44      Physical Exam :    -   Dressing changed minimal dry spotting on dressing.  No acitive drainage no errythema or fluctulance.   prineo tape in place  -   Wound C/D/I.   -   Distal Neurvascular status intact grossly.   -   Warm well perfused; capillary refill <3 seconds   -   (+)EHL/FHL 5/5 dorsi/plantar flexion intact  -   (+) Sensation to light touch  -   (-) Calf tenderness Bilaterally    A/P: 63y Female s/p Osteoarthritis of right hip    -   Ortho Stable  -   Pain control   -   Medicine to follow  -   DVT ppx:     [x ]SCDs     [x ] ASA     [ ] Eliquis     [ ] Lovenox  -   Weight bearing status:  WBAT [x]        PWB    [ ]     TTWB  [ ]      NWB  [ ]   -  Dispo:     Home [ x]     Acute Rehab [ ]     ANIVAL [ ]     TBD [ ]
Orthopaedic Post Op Note    Procedure: Right THR  Surgeon: Jayme Diaz    63y Female comfortable, without complaints. Was up and ambulated with PT.  Tolerating diet. No void yet.  Reported pain score = 0  Denies N/V, CP, SOB, numbness/tingling of extremities.    PE:  Vital Signs Last 24 Hrs  T(C): 36.7 (29 Apr 2019 13:15), Max: 37.2 (29 Apr 2019 08:29)  T(F): 98 (29 Apr 2019 13:15), Max: 99 (29 Apr 2019 08:29)  HR: 84 (29 Apr 2019 14:00) (71 - 91)  BP: 135/80 (29 Apr 2019 14:00) (122/77 - 135/80)  RR: 13 (29 Apr 2019 14:00) (13 - 21)  SpO2: 99% (29 Apr 2019 14:00) (99% - 100%)  General: Pt alert and oriented   Lungs: + BS CTA bilaterally  Heart: +S1 & S2 heard, RRR  Abd: + BS heard, soft, NT, ND  Right Hip Dressing: C/D/I   Abd pillow in place  Bilateral LEs:  Motor:   5/5 dorsiflexion, plantarflexion, EHL  Sensation intact to LT   2+ DP Pulses  SCDs in place    A/P: 63y Female stable POD#0 s/p Right THR   -  Acetaminophen, Celebrex, Dilaudid/Oxycodone for Pain Control   - DVT ppx: Aspirin  - Lexie op IV abx: Ancef  - Celebrex for HO ppx  - total hip precautions  - PT, OT per protocol  - F/U AM Labs  DCP = Home Wednesday if PT, OT, medically cleared
Patient is a 63y old  Female who presents with a chief complaint of Right hip osteoarthritis (30 Apr 2019 09:40)    INTERVAL HPI/OVERNIGHT EVENTS: feeling well, no complaints.  pain better now.     MEDICATIONS  (STANDING):  acetaminophen   Tablet .. 1000 milliGRAM(s) Oral every 8 hours  aspirin enteric coated 81 milliGRAM(s) Oral every 12 hours  celecoxib 200 milliGRAM(s) Oral two times a day  docusate sodium 100 milliGRAM(s) Oral three times a day  lactated ringers. 1000 milliLiter(s) (125 mL/Hr) IV Continuous <Continuous>  levothyroxine 50 MICROGram(s) Oral daily  pantoprazole    Tablet 40 milliGRAM(s) Oral before breakfast  senna 2 Tablet(s) Oral at bedtime    MEDICATIONS  (PRN):  aluminum hydroxide/magnesium hydroxide/simethicone Suspension 30 milliLiter(s) Oral four times a day PRN Indigestion  HYDROmorphone  Injectable 0.5 milliGRAM(s) IV Push every 3 hours PRN Severe Pain (7 - 10)  magnesium hydroxide Suspension 30 milliLiter(s) Oral daily PRN Constipation  ondansetron Injectable 4 milliGRAM(s) IV Push every 6 hours PRN Nausea and/or Vomiting  oxyCODONE    IR 5 milliGRAM(s) Oral every 3 hours PRN Mild Pain (1 - 3)  oxyCODONE    IR 10 milliGRAM(s) Oral every 3 hours PRN Moderate Pain (4 - 6)  polyethylene glycol 3350 17 Gram(s) Oral daily PRN Constipation      Allergies  epinephrine (Other)  silver ;rash (Rash)      REVIEW OF SYSTEMS:  CONSTITUTIONAL: No fever, weight loss, or fatigue  EYES: No eye pain, visual disturbances, or discharge  ENMT:  No difficulty hearing, tinnitus, vertigo; No sinus or throat pain  NECK: No pain or stiffness  BREASTS: No pain, masses, or nipple discharge  RESPIRATORY: No cough, wheezing, chills or hemoptysis; No shortness of breath  CARDIOVASCULAR: No chest pain, palpitations, or lightheadedness  GASTROINTESTINAL: No abdominal or epigastric pain. No nausea, vomiting, or hematemesis; No diarrhea or constipation. No melena or hematochezia.  GENITOURINARY: No dysuria, frequency, hematuria, or incontinence  NEUROLOGICAL: No headaches, vertigo, memory loss, loss of strength, numbness, or tremors  SKIN: No itching, burning, rashes, or lesions   LYMPH NODES: No enlarged glands  ENDOCRINE: No heat or cold intolerance; No hair loss; No polydipsia or polyuria  MUSCULOSKELETAL: No back pain  PSYCHIATRIC: No depression, anxiety, or mood swings  HEME/LYMPH: No easy bruising, or bleeding gums  ALLERGY AND IMMUNOLOGIC: No hives or eczema    Vital Signs Last 24 Hrs  T(C): 36.4 (30 Apr 2019 11:27), Max: 36.7 (29 Apr 2019 19:05)  T(F): 97.5 (30 Apr 2019 11:27), Max: 98.1 (29 Apr 2019 19:05)  HR: 79 (30 Apr 2019 11:27) (70 - 91)  BP: 130/85 (30 Apr 2019 11:27) (121/81 - 136/82)  BP(mean): --  RR: 18 (30 Apr 2019 11:27) (15 - 18)  SpO2: 100% (30 Apr 2019 11:27) (96% - 100%)    PHYSICAL EXAM:  GENERAL: NAD, well-groomed, well-developed  HEAD:  Atraumatic, Normocephalic  EYES: EOMI, PERRLA, conjunctiva and sclera clear  ENMT: Moist mucous membranes  NECK: Supple, No JVD  NERVOUS SYSTEM:  Alert & Oriented X3, Good concentration; Bilateral LE mobile, sensation to light touch intact  CHEST/LUNG: Clear to auscultation bilaterally; No rales, rhonchi, wheezing, or rubs  HEART: Regular rate and rhythm; No murmurs, rubs, or gallops  ABDOMEN: Soft, Nontender, Nondistended; Bowel sounds present  EXTREMITIES:  2+ Peripheral Pulses, No clubbing or cyanosis  LYMPH: No lymphadenopathy noted  SKIN: No rashes or lesions  INCISION:  Dressing dry and intact    LABS:                        10.6   14.93 )-----------( 232      ( 30 Apr 2019 07:44 )             31.6     30 Apr 2019 07:44    134    |  97     |  16     ----------------------------<  112    4.0     |  29     |  0.70     Ca    8.5        30 Apr 2019 07:44          CAPILLARY BLOOD GLUCOSE          RADIOLOGY & ADDITIONAL TESTS:    Imaging Personally Reviewed:      [ ] Consultant(s) Notes Reviewed  [x] Care Discussed with Consultants/Other Providers:  Ortho PA- plan of care
Patient is a 63y old  Female who presents with a chief complaint of Right hip osteoarthritis (30 Apr 2019 09:40)    INTERVAL HPI/OVERNIGHT EVENTS: feeling well, no new complaints. pain controlled.     MEDICATIONS  (STANDING):  acetaminophen   Tablet .. 1000 milliGRAM(s) Oral every 8 hours  aspirin enteric coated 81 milliGRAM(s) Oral every 12 hours  celecoxib 200 milliGRAM(s) Oral two times a day  docusate sodium 100 milliGRAM(s) Oral three times a day  lactated ringers. 1000 milliLiter(s) (125 mL/Hr) IV Continuous <Continuous>  levothyroxine 50 MICROGram(s) Oral daily  losartan 100 milliGRAM(s) Oral daily  pantoprazole    Tablet 40 milliGRAM(s) Oral before breakfast  senna 2 Tablet(s) Oral at bedtime    MEDICATIONS  (PRN):  aluminum hydroxide/magnesium hydroxide/simethicone Suspension 30 milliLiter(s) Oral four times a day PRN Indigestion  bisacodyl Suppository 10 milliGRAM(s) Rectal daily PRN If no bowel movement by POD#2  HYDROmorphone  Injectable 0.5 milliGRAM(s) IV Push every 3 hours PRN Severe Pain (7 - 10)  magnesium hydroxide Suspension 30 milliLiter(s) Oral daily PRN Constipation  ondansetron Injectable 4 milliGRAM(s) IV Push every 6 hours PRN Nausea and/or Vomiting  oxyCODONE    IR 5 milliGRAM(s) Oral every 3 hours PRN Mild Pain (1 - 3)  oxyCODONE    IR 10 milliGRAM(s) Oral every 3 hours PRN Moderate Pain (4 - 6)  polyethylene glycol 3350 17 Gram(s) Oral daily PRN Constipation      Allergies  epinephrine (Other)  silver ;rash (Rash)      REVIEW OF SYSTEMS:  CONSTITUTIONAL: No fever, weight loss, or fatigue  EYES: No eye pain, visual disturbances, or discharge  ENMT:  No difficulty hearing, tinnitus, vertigo; No sinus or throat pain  NECK: No pain or stiffness  BREASTS: No pain, masses, or nipple discharge  RESPIRATORY: No cough, wheezing, chills or hemoptysis; No shortness of breath  CARDIOVASCULAR: No chest pain, palpitations, or lightheadedness  GASTROINTESTINAL: No abdominal or epigastric pain. No nausea, vomiting, or hematemesis; No diarrhea or constipation. No melena or hematochezia.  GENITOURINARY: No dysuria, frequency, hematuria, or incontinence  NEUROLOGICAL: No headaches, vertigo, memory loss, loss of strength, numbness, or tremors  SKIN: No itching, burning, rashes, or lesions   LYMPH NODES: No enlarged glands  ENDOCRINE: No heat or cold intolerance; No hair loss; No polydipsia or polyuria  MUSCULOSKELETAL: No back pain  PSYCHIATRIC: No depression, anxiety, or mood swings  HEME/LYMPH: No easy bruising, or bleeding gums  ALLERGY AND IMMUNOLOGIC: No hives or eczema    Vital Signs Last 24 Hrs  T(C): 36.4 (01 May 2019 07:59), Max: 37.3 (30 Apr 2019 23:07)  T(F): 97.5 (01 May 2019 07:59), Max: 99.1 (30 Apr 2019 23:07)  HR: 87 (01 May 2019 07:59) (74 - 87)  BP: 113/75 (01 May 2019 07:59) (91/58 - 119/83)  BP(mean): --  RR: 19 (01 May 2019 07:59) (14 - 19)  SpO2: 98% (01 May 2019 07:59) (98% - 98%)    PHYSICAL EXAM:  GENERAL: NAD, well-groomed, well-developed  HEAD:  Atraumatic, Normocephalic  EYES: EOMI, PERRLA, conjunctiva and sclera clear  ENMT: Moist mucous membranes  NECK: Supple, No JVD  NERVOUS SYSTEM:  Alert & Oriented X3, Good concentration; Bilateral LE mobile, sensation to light touch intact  CHEST/LUNG: Clear to auscultation bilaterally; No rales, rhonchi, wheezing, or rubs  HEART: Regular rate and rhythm; No murmurs, rubs, or gallops  ABDOMEN: Soft, Nontender, Nondistended; Bowel sounds present  EXTREMITIES:  2+ Peripheral Pulses, No clubbing or cyanosis  LYMPH: No lymphadenopathy noted  SKIN: No rashes or lesions  INCISION:  Dressing dry and intact    LABS:                        11.1   11.05 )-----------( 239      ( 01 May 2019 07:28 )             32.6     01 May 2019 07:28    137    |  100    |  16     ----------------------------<  99     3.6     |  31     |  0.72     Ca    8.3        01 May 2019 07:28          CAPILLARY BLOOD GLUCOSE          RADIOLOGY & ADDITIONAL TESTS:    Imaging Personally Reviewed:      [ ] Consultant(s) Notes Reviewed  [x] Care Discussed with Consultants/Other Providers:  Ortho PA- plan of care
Post Op Day # 1    SUBJECTIVE    62yo Female status post Right THR .   Patient is alert and comfortable.    Pain is controlled with current pain regimen.  Denies nausea, vomiting, chest pain, shortness of breath, abdominal pain or fever.   No new complaints.    OBJECTIVE    Vital Signs Last 24 Hrs  T(C): 36.7 (30 Apr 2019 07:36), Max: 36.7 (29 Apr 2019 13:15)  T(F): 98 (30 Apr 2019 07:36), Max: 98.1 (29 Apr 2019 19:05)  HR: 70 (30 Apr 2019 07:36) (70 - 91)  BP: 129/79 (30 Apr 2019 07:36) (121/81 - 135/80)  BP(mean): --  RR: 18 (30 Apr 2019 07:36) (13 - 21)  SpO2: 100% (30 Apr 2019 07:36) (96% - 100%)  I&O's Summary    29 Apr 2019 07:01  -  30 Apr 2019 07:00  --------------------------------------------------------  IN: 1320 mL / OUT: 1150 mL / NET: 170 mL        PHYSICAL EXAM    Right Hip dressing is clean, dry and intact.   The calf is supple/nontender.   Sensation to light touch is grossly intact distally.   Motor function distally is intact.   No foot drop.   (2+) dorsalis pedis pulse. Capillary refill is less than 2 seconds. No cyanosis.                          10.6<L>  14.93<H> )-----------( 232      ( 30 Apr 2019 07:44 )             31.6<L>  30 Apr 2019 07:44                        12.2   18.15<H> )-----------( 291      ( 29 Apr 2019 17:38 )             37.3   29 Apr 2019 17:38    30 Apr 2019 07:44    134<L>  |  97     |  16     ----------------------------<  112<H>  4.0     |  29     |  0.70   29 Apr 2019 17:38    138    |  100    |  15     ----------------------------<  137<H>  3.5     |  28     |  0.80     Ca    8.5        30 Apr 2019 07:44  Ca    8.9        29 Apr 2019 17:38        ASSESSMENT AND PLAN    - Orthopedically stable  - DVT prophylaxis: PAS + Ecotrin  -  HO prophylaxis: Celebrex 200mg PO twice daily x21 days  - Continue physical therapy and occupational therapy  - Weight bearing as tolerated of the right lower extremity with assistance of a walker  - Incentive spirometry encouraged  - Pain control as clinically indicated  - Disposition: Home when medically stable and cleared by PT/OT

## 2019-05-13 ENCOUNTER — APPOINTMENT (OUTPATIENT)
Dept: ORTHOPEDIC SURGERY | Facility: CLINIC | Age: 64
End: 2019-05-13
Payer: COMMERCIAL

## 2019-05-13 VITALS — HEART RATE: 75 BPM | SYSTOLIC BLOOD PRESSURE: 142 MMHG | DIASTOLIC BLOOD PRESSURE: 87 MMHG

## 2019-05-13 PROCEDURE — 99024 POSTOP FOLLOW-UP VISIT: CPT

## 2019-05-13 PROCEDURE — 73502 X-RAY EXAM HIP UNI 2-3 VIEWS: CPT

## 2019-06-25 ENCOUNTER — APPOINTMENT (OUTPATIENT)
Dept: ORTHOPEDIC SURGERY | Facility: CLINIC | Age: 64
End: 2019-06-25
Payer: COMMERCIAL

## 2019-06-25 VITALS
DIASTOLIC BLOOD PRESSURE: 80 MMHG | BODY MASS INDEX: 27.32 KG/M2 | WEIGHT: 170 LBS | HEART RATE: 64 BPM | HEIGHT: 66 IN | SYSTOLIC BLOOD PRESSURE: 117 MMHG

## 2019-06-25 PROCEDURE — 73502 X-RAY EXAM HIP UNI 2-3 VIEWS: CPT

## 2019-06-25 PROCEDURE — 99024 POSTOP FOLLOW-UP VISIT: CPT

## 2019-06-25 NOTE — HISTORY OF PRESENT ILLNESS
[___ Weeks Post Op] : [unfilled] weeks post op [Chills] : no chills [Healed] : not healed [Fever] : no fever [Neuro Intact] : an unremarkable neurological exam [Vascular Intact] : ~T peripheral vascular exam normal [Xray (Date:___)] : [unfilled] Xray -  [Negative Nelly's] : maneuvers demonstrated a negative Nelly's sign [Doing Well] : is doing well [Hardware in Good Position] : hardware in good position [Excellent Pain Control] : has excellent pain control [None] : None [No Sign of Infection] : is showing no signs of infection [de-identified] : Right total hip replacement 4/29/19 [de-identified] : 8 weeks postop right total hip replacement. Patient doing extremely well has resumed all normal activities. Still continues with physical therapy. Due to the extensive amount of weakness in her right hip since she had such a long period of disability before having surgery [de-identified] : We have a weNeurovascular status of the lower extremities are within normal limits. Good sensation and pulses at the ankle. OD STRENGTH AGAINST HIS ASSISTANCE IN EHL and dorsiflexion. Right hip or flex to 176, allowed 30° of external rotation 10° of internal rotaer, negative to about 80° of external rotattion to greater than 30° [de-identified] : AP pelvis and Right hip x-ray was performed in the office today. Leg lengths appear perfectly equal the acetabular and femoral component appear well fixed and anatomically aligned. There is no obvious change in position of the installed components from the postoperative films. [de-identified] : Patient seen with Dr. Garcia, today. We'll continue with a strengthening and stretching program will be seen back in a years time

## 2020-07-28 ENCOUNTER — APPOINTMENT (OUTPATIENT)
Dept: ORTHOPEDIC SURGERY | Facility: CLINIC | Age: 65
End: 2020-07-28
Payer: COMMERCIAL

## 2020-07-28 VITALS
WEIGHT: 172 LBS | DIASTOLIC BLOOD PRESSURE: 89 MMHG | BODY MASS INDEX: 27.64 KG/M2 | TEMPERATURE: 97.7 F | HEIGHT: 66 IN | SYSTOLIC BLOOD PRESSURE: 150 MMHG | HEART RATE: 59 BPM

## 2020-07-28 PROCEDURE — 73502 X-RAY EXAM HIP UNI 2-3 VIEWS: CPT | Mod: RT

## 2020-07-28 PROCEDURE — 99213 OFFICE O/P EST LOW 20 MIN: CPT

## 2020-07-28 RX ORDER — POTASSIUM GLUCONATE 575(95)MG
TABLET, EXTENDED RELEASE ORAL
Refills: 0 | Status: DISCONTINUED | COMMUNITY
End: 2020-07-28

## 2020-07-28 RX ORDER — LEVOTHYROXINE SODIUM 137 UG/1
TABLET ORAL
Refills: 0 | Status: DISCONTINUED | COMMUNITY
End: 2020-07-28

## 2020-07-28 RX ORDER — LOSARTAN POTASSIUM 100 MG/1
TABLET, FILM COATED ORAL
Refills: 0 | Status: DISCONTINUED | COMMUNITY
End: 2020-07-28

## 2021-08-30 ENCOUNTER — RX RENEWAL (OUTPATIENT)
Age: 66
End: 2021-08-30

## 2023-02-02 ENCOUNTER — OFFICE (OUTPATIENT)
Dept: URBAN - METROPOLITAN AREA CLINIC 109 | Facility: CLINIC | Age: 68
Setting detail: OPHTHALMOLOGY
End: 2023-02-02
Payer: MEDICARE

## 2023-02-02 DIAGNOSIS — H02.835: ICD-10-CM

## 2023-02-02 DIAGNOSIS — H02.831: ICD-10-CM

## 2023-02-02 DIAGNOSIS — H40.003: ICD-10-CM

## 2023-02-02 DIAGNOSIS — H43.392: ICD-10-CM

## 2023-02-02 DIAGNOSIS — H02.834: ICD-10-CM

## 2023-02-02 DIAGNOSIS — H02.832: ICD-10-CM

## 2023-02-02 DIAGNOSIS — H35.372: ICD-10-CM

## 2023-02-02 DIAGNOSIS — H25.13: ICD-10-CM

## 2023-02-02 DIAGNOSIS — H35.413: ICD-10-CM

## 2023-02-02 DIAGNOSIS — H16.223: ICD-10-CM

## 2023-02-02 DIAGNOSIS — H43.811: ICD-10-CM

## 2023-02-02 PROCEDURE — 99213 OFFICE O/P EST LOW 20 MIN: CPT | Performed by: OPHTHALMOLOGY

## 2023-02-02 PROCEDURE — 92133 CPTRZD OPH DX IMG PST SGM ON: CPT | Performed by: OPHTHALMOLOGY

## 2023-02-02 ASSESSMENT — REFRACTION_MANIFEST
OD_SPHERE: +1.25
OS_ADD: +2.25
OD_ADD: +2.25
OS_SPHERE: +1.25
OS_VA1: 20/25+
OD_VA1: 20/25+

## 2023-02-02 ASSESSMENT — REFRACTION_AUTOREFRACTION
OD_AXIS: 175
OS_CYLINDER: -1.50
OS_SPHERE: +3.75
OD_CYLINDER: -0.75
OS_AXIS: 38
OD_SPHERE: +3.50

## 2023-02-02 ASSESSMENT — SPHEQUIV_DERIVED
OS_SPHEQUIV: 3
OD_SPHEQUIV: 3.125

## 2023-02-02 ASSESSMENT — CONFRONTATIONAL VISUAL FIELD TEST (CVF)
OD_FINDINGS: FULL
OS_FINDINGS: FULL

## 2023-02-02 ASSESSMENT — SUPERFICIAL PUNCTATE KERATITIS (SPK)
OD_SPK: 1+
OS_SPK: T 1+

## 2023-02-02 ASSESSMENT — TONOMETRY
OD_IOP_MMHG: 15
OS_IOP_MMHG: 17

## 2023-02-02 ASSESSMENT — LID POSITION - DERMATOCHALASIS
OD_DERMATOCHALASIS: 1+
OS_DERMATOCHALASIS: 1+

## 2023-02-02 ASSESSMENT — VISUAL ACUITY
OS_BCVA: 20/40-2
OD_BCVA: 20/40-1

## 2023-02-02 ASSESSMENT — PACHYMETRY
OS_CT_CORRECTION: 1
OD_CT_UM: 528
OD_CT_CORRECTION: 1
OS_CT_UM: 530

## 2023-04-09 NOTE — PHYSICAL THERAPY INITIAL EVALUATION ADULT - IMPAIRMENTS CONTRIBUTING IMPAIRED BED MOBILITY, REHAB EVAL
refused to wait for provider eval, ambulated out of er with steady gait.
pain/decreased strength/decreased ROM/decreased flexibility

## 2023-06-01 ENCOUNTER — OFFICE (OUTPATIENT)
Dept: URBAN - METROPOLITAN AREA CLINIC 109 | Facility: CLINIC | Age: 68
Setting detail: OPHTHALMOLOGY
End: 2023-06-01
Payer: MEDICARE

## 2023-06-01 DIAGNOSIS — H35.372: ICD-10-CM

## 2023-06-01 DIAGNOSIS — H40.003: ICD-10-CM

## 2023-06-01 DIAGNOSIS — H02.835: ICD-10-CM

## 2023-06-01 DIAGNOSIS — H35.413: ICD-10-CM

## 2023-06-01 DIAGNOSIS — H02.831: ICD-10-CM

## 2023-06-01 DIAGNOSIS — H02.834: ICD-10-CM

## 2023-06-01 DIAGNOSIS — H25.13: ICD-10-CM

## 2023-06-01 DIAGNOSIS — H43.811: ICD-10-CM

## 2023-06-01 DIAGNOSIS — H02.832: ICD-10-CM

## 2023-06-01 DIAGNOSIS — H16.223: ICD-10-CM

## 2023-06-01 DIAGNOSIS — H43.392: ICD-10-CM

## 2023-06-01 PROCEDURE — 99213 OFFICE O/P EST LOW 20 MIN: CPT | Performed by: OPHTHALMOLOGY

## 2023-06-01 ASSESSMENT — REFRACTION_AUTOREFRACTION
OD_AXIS: 175
OS_AXIS: 38
OS_CYLINDER: -1.50
OD_SPHERE: +3.50
OD_CYLINDER: -0.75
OS_SPHERE: +3.75

## 2023-06-01 ASSESSMENT — SPHEQUIV_DERIVED
OS_SPHEQUIV: 3
OD_SPHEQUIV: 3.125

## 2023-06-01 ASSESSMENT — PACHYMETRY
OS_CT_CORRECTION: 1
OD_CT_CORRECTION: 1
OS_CT_UM: 530
OD_CT_UM: 528

## 2023-06-01 ASSESSMENT — SUPERFICIAL PUNCTATE KERATITIS (SPK)
OS_SPK: T 1+
OD_SPK: 1+

## 2023-06-01 ASSESSMENT — LID POSITION - DERMATOCHALASIS
OS_DERMATOCHALASIS: 1+
OD_DERMATOCHALASIS: 1+

## 2023-06-01 ASSESSMENT — TONOMETRY
OD_IOP_MMHG: 16
OS_IOP_MMHG: 16

## 2023-06-01 ASSESSMENT — REFRACTION_MANIFEST
OS_ADD: +2.25
OD_VA1: 20/25-
OS_VA1: 20/30-2
OD_ADD: +2.25
OS_SPHERE: +1.25
OD_SPHERE: +1.25

## 2023-06-01 ASSESSMENT — CONFRONTATIONAL VISUAL FIELD TEST (CVF)
OS_FINDINGS: FULL
OD_FINDINGS: FULL

## 2023-06-01 ASSESSMENT — VISUAL ACUITY
OD_BCVA: 20/30
OS_BCVA: 20/40-2

## 2023-08-01 NOTE — PHYSICAL THERAPY INITIAL EVALUATION ADULT - BALANCE DISTURBANCE, IDENTIFIED IMPAIRMENT CONTRIBUTE, REHAB EVAL
Internal Medicine Admission Note    Date of Admission: 7/31/2023  Admitting Provider: Tboias Rhodes MD     CHIEF COMPLAINT: Chest Pain     HISTORY OF PRESENT ILLNESS:     This pt is a 67 y/o F normally seen in [facility]  With a pmh for HTN, DM2, HLD, EILEEN, history of breast cancer in 2018 s/p radiation, lumpectomy, and anastrozole who presents with chest pain.     The pt had chest pain that began this afternoon central chest pain that radiates into her back,  Chest pain lasted approximately 7-10 minutes then resolved. Denies SOB  Denies N/V.             PAST MEDICAL HISTORY:  No past medical history on file.    PAST SURGICAL HISTORY:  No past surgical history on file.    MEDICATIONS:  No current facility-administered medications for this encounter.     Current Outpatient Medications   Medication Sig   • ZYRTEC 10 MG PO TABS 1 TAB DAILY BY MOUTH  TIL HIVES CLEAR FOR 5 DAYS       ALLERGIES:  ALLERGIES:  No Known Allergies    FAMILY HISTORY:  No family history on file.    SOCIAL HISTORY:       REVIEW OF SYSTEMS: (Bold text indicates positive ROS- otherwise negative)  General - Headache, fever, chills, faintness, dizziness, weight loss and fatigue  HEENT - Vision changes, hearing changes, epistaxis, sinus problems and dysphagia  Pulmonary - Cough, SOB, sputum, hemoptysis and wheezing  CV - Chest pain, syncope, TODD, palpitation, orthopnea and edema  GI - Nausea, vomiting, diarrhea and constipation, bowel habit changes, bleeding, reflux   - Incontinence, dysuria, hematuria, polyuria and discharge  MS - Pain, swelling, muscular weakness and redness  Skin - Rash, pruritus, lesions and jaundice  Neuro - LOC, AMS, seizures, sensory or motor deficiencies  Psych - Anxiety, depression, agitation and dependencies    PHYSICAL EXAM:  Vital 24 Hour Range Most Recent Value   Temperature Temp  Min: 98.4 °F (36.9 °C)  Max: 98.4 °F (36.9 °C) 98.4 °F (36.9 °C)     Pulse Pulse  Min: 76  Max: 77 76   Respiratory Resp  Min: 16   Max: 18 16   Blood Pressure BP  Min: 139/94  Max: 178/81 (!) 144/82   Pulse Oximetry SpO2  Min: 97 %  Max: 97 %     O2 No data recorded       Vital Most Recent Value First Value   Weight 98 kg (216 lb) Weight: 98 kg (216 lb)   Height 5' 6\" (167.6 cm) Height: 5' 6\" (167.6 cm)   General: No apparent distress, looks appropriate for age.   Heent: NCAT. No nasal or ear discharge noted. No oropharyngeal erythema noted. Oropharyngeal mucosa moist.  Eyes: Extraocular muscle movements are intact. Sclerae nonicteric. Conjunctivae pink.  Neck: Supple, range of motion normal. No masses palpable . No thyromegaly. Trachea midline.  Lymph: No cervical, supraclavicular, or axillary lymphadenopathy.  Cvs: S1-S2 normal. Regular rhythm. No murmurs or gallops heard. No lower extremity edema.  Pulses 2+ bilaterally radial and dorsalis pedis.  Pulmonary: Normal inspiratory effort. Equal expansion bilaterally. Lungs are clear to auscultation bilaterally. No w/r/r  Abdomen: Normal on inspection. On palpation, abdomen soft and nontender, no masses palpable. Bowel sounds present.   Musculoskeletal: No joint swelling redness or tenderness appreciated. Range of motion normal in all 4 extremities.  Skin: Warm and dry to touch. No rashes or lesions noted.  Neurologic: Alert and oriented x3. No focal neurological deficits noted. Cranial nerve 2-12 grossly intact. Sensation to touch intact. Deep tendon reflexes 2+ all 4 extremities. Strength 5 out 5 in all 4 extremities. Cerebellar function intact. Gait normal.  Psychiatric: Normal mood and affect. No agitation, depression or anxiety noted.      LABS:  Recent Labs   Lab 07/31/23 1939   WBC 6.9   RBC 4.57   HCT 41.5   HGB 13.8        Recent Labs   Lab 07/31/23 1939   SODIUM 139   POTASSIUM 4.1   CHLORIDE 102   CO2 30   GLUCOSE 147*   BUN 18   CREATININE 0.75   CALCIUM 9.3   ALBUMIN 3.6   MG 1.8   BILIRUBIN 0.3   ALKPT 67   AST 17   GPT 32   LIPA 83     No results found for: \"PTT\", \"BAND\",  \"RESR\", \"CORTDX\", \"BNP\", \"MMB\", \"MYOGLOBIN\", \"RAPDTR\", \"CPK\", \"URIC\", \"NH3\", \"CRP\", \"OSMO\", \"TSH\", \"DUDLEY\", \"UOSM\"  Urinalysis  No results found for: \"USPG\", \"UPROT\", \"UWBC\", \"URBC\", \"5UNITR\", \"UBILI\", \"UPH\", \"UROB\", \"UBACTR\"  Iron Panel  No results found for: \"IRON\", \"PST\"  IMAGING:  XR CHEST PA OR AP 1 VIEW    Result Date: 7/31/2023  EXAM:  XR CHEST AP OR PA HISTORY:  chest pain COMPARISON:  None. TECHNIQUE:  A single portable AP semiupright view of the chest was obtained.  FINDINGS:  The heart is not enlarged.  The lungs appear well expanded and clear. Monitoring leads are seen superimposed over the chest.     IMPRESSION:  No active disease in the chest. Electronically Signed by: Tyler Busch MD Signed on: 7/31/2023 8:16 PM Workstation ID: [id number]      XR CHEST PA OR AP 1 VIEW   Final Result   IMPRESSION:        No active disease in the chest.         Electronically Signed by: Tyler Busch MD    Signed on: 7/31/2023 8:16 PM    Workstation ID: [id number]              ASSESSMENT AND PLAN:    This pt is a 67 y/o F normally seen in [facility]  With a pmh for HTN, DM2, HLD, EILEEN, history of breast cancer in 2018 s/p radiation, lumpectomy, and anastrozole who presents with chest pain.     Will order NM stress test for the am will trend troponin     HTN, DM2, HLD continue outpatient medications once med rec complete         DVT Prophylaxis: Lovenox   CODE STATUS: Full Code     Is the patient expected to require at least a two midnight stay in the hospital? No    Signed:  Tobias Rhodes MD  7/31/2023             decreased strength/pain/decreased ROM

## 2023-10-02 RX ORDER — AMOXICILLIN 500 MG/1
500 CAPSULE ORAL
Qty: 20 | Refills: 4 | Status: ACTIVE | OUTPATIENT
Start: 2019-06-25

## 2023-10-03 RX ORDER — AMOXICILLIN 500 MG/1
500 CAPSULE ORAL
Qty: 12 | Refills: 1 | Status: ACTIVE | COMMUNITY
Start: 2020-07-28 | End: 1900-01-01

## 2023-11-16 ENCOUNTER — OFFICE (OUTPATIENT)
Dept: URBAN - METROPOLITAN AREA CLINIC 109 | Facility: CLINIC | Age: 68
Setting detail: OPHTHALMOLOGY
End: 2023-11-16
Payer: MEDICARE

## 2023-11-16 DIAGNOSIS — H43.392: ICD-10-CM

## 2023-11-16 DIAGNOSIS — H43.811: ICD-10-CM

## 2023-11-16 DIAGNOSIS — H02.832: ICD-10-CM

## 2023-11-16 DIAGNOSIS — H02.831: ICD-10-CM

## 2023-11-16 DIAGNOSIS — H25.13: ICD-10-CM

## 2023-11-16 DIAGNOSIS — H16.223: ICD-10-CM

## 2023-11-16 DIAGNOSIS — H35.413: ICD-10-CM

## 2023-11-16 DIAGNOSIS — H35.372: ICD-10-CM

## 2023-11-16 DIAGNOSIS — H40.003: ICD-10-CM

## 2023-11-16 DIAGNOSIS — H02.834: ICD-10-CM

## 2023-11-16 DIAGNOSIS — H02.835: ICD-10-CM

## 2023-11-16 PROCEDURE — 92083 EXTENDED VISUAL FIELD XM: CPT | Performed by: OPHTHALMOLOGY

## 2023-11-16 PROCEDURE — 92014 COMPRE OPH EXAM EST PT 1/>: CPT | Performed by: OPHTHALMOLOGY

## 2023-11-16 PROCEDURE — 92020 GONIOSCOPY: CPT | Performed by: OPHTHALMOLOGY

## 2023-11-16 PROCEDURE — 92250 FUNDUS PHOTOGRAPHY W/I&R: CPT | Performed by: OPHTHALMOLOGY

## 2023-11-16 ASSESSMENT — SPHEQUIV_DERIVED
OS_SPHEQUIV: 2.625
OD_SPHEQUIV: 2.25

## 2023-11-16 ASSESSMENT — SUPERFICIAL PUNCTATE KERATITIS (SPK)
OD_SPK: 1+
OS_SPK: T 1+

## 2023-11-16 ASSESSMENT — REFRACTION_AUTOREFRACTION
OD_CYLINDER: -1.00
OD_SPHERE: +2.75
OS_CYLINDER: -1.25
OS_SPHERE: +3.25
OS_AXIS: 68
OD_AXIS: 144

## 2023-11-16 ASSESSMENT — REFRACTION_MANIFEST
OS_VA1: 20/30-2
OD_VA1: 20/25-
OS_SPHERE: +1.25
OD_SPHERE: +1.25
OD_ADD: +2.25
OS_ADD: +2.25

## 2023-11-16 ASSESSMENT — CONFRONTATIONAL VISUAL FIELD TEST (CVF)
OS_FINDINGS: FULL
OD_FINDINGS: FULL

## 2023-11-16 ASSESSMENT — LID POSITION - DERMATOCHALASIS
OS_DERMATOCHALASIS: 1+
OD_DERMATOCHALASIS: 1+

## 2024-03-20 ENCOUNTER — OFFICE (OUTPATIENT)
Dept: URBAN - METROPOLITAN AREA CLINIC 109 | Facility: CLINIC | Age: 69
Setting detail: OPHTHALMOLOGY
End: 2024-03-20
Payer: MEDICARE

## 2024-03-20 DIAGNOSIS — H43.811: ICD-10-CM

## 2024-03-20 DIAGNOSIS — H40.003: ICD-10-CM

## 2024-03-20 DIAGNOSIS — H02.834: ICD-10-CM

## 2024-03-20 DIAGNOSIS — H25.13: ICD-10-CM

## 2024-03-20 DIAGNOSIS — H16.223: ICD-10-CM

## 2024-03-20 DIAGNOSIS — H43.392: ICD-10-CM

## 2024-03-20 DIAGNOSIS — H35.372: ICD-10-CM

## 2024-03-20 DIAGNOSIS — H02.832: ICD-10-CM

## 2024-03-20 DIAGNOSIS — H35.413: ICD-10-CM

## 2024-03-20 DIAGNOSIS — H02.831: ICD-10-CM

## 2024-03-20 DIAGNOSIS — H02.835: ICD-10-CM

## 2024-03-20 PROCEDURE — 99213 OFFICE O/P EST LOW 20 MIN: CPT | Performed by: OPHTHALMOLOGY

## 2024-03-20 PROCEDURE — 92133 CPTRZD OPH DX IMG PST SGM ON: CPT | Performed by: OPHTHALMOLOGY

## 2024-03-20 ASSESSMENT — REFRACTION_MANIFEST
OS_VA1: 20/30-2
OS_SPHERE: +0.25
OD_VA1: 20/25-
OD_VA1: 20/30+2
OS_SPHERE: +1.25
OD_SPHERE: +0.75
OD_ADD: +2.25
OD_SPHERE: +1.25
OS_VA1: 20/40-2
OS_ADD: +2.25

## 2024-03-20 ASSESSMENT — LID POSITION - DERMATOCHALASIS
OS_DERMATOCHALASIS: 1+
OD_DERMATOCHALASIS: 1+

## 2024-07-02 ENCOUNTER — APPOINTMENT (OUTPATIENT)
Dept: ORTHOPEDIC SURGERY | Facility: CLINIC | Age: 69
End: 2024-07-02
Payer: MEDICARE

## 2024-07-02 VITALS
BODY MASS INDEX: 25.71 KG/M2 | DIASTOLIC BLOOD PRESSURE: 86 MMHG | WEIGHT: 160 LBS | SYSTOLIC BLOOD PRESSURE: 138 MMHG | HEART RATE: 88 BPM | HEIGHT: 66 IN

## 2024-07-02 DIAGNOSIS — Z96.641 PRESENCE OF RIGHT ARTIFICIAL HIP JOINT: ICD-10-CM

## 2024-07-02 PROCEDURE — 73502 X-RAY EXAM HIP UNI 2-3 VIEWS: CPT | Mod: RT

## 2024-07-02 PROCEDURE — 99203 OFFICE O/P NEW LOW 30 MIN: CPT

## 2024-07-02 RX ORDER — AMOXICILLIN 500 MG/1
500 CAPSULE ORAL
Qty: 20 | Refills: 4 | Status: ACTIVE | COMMUNITY
Start: 2024-07-02 | End: 1900-01-01

## 2024-07-10 ENCOUNTER — OFFICE (OUTPATIENT)
Dept: URBAN - METROPOLITAN AREA CLINIC 109 | Facility: CLINIC | Age: 69
Setting detail: OPHTHALMOLOGY
End: 2024-07-10
Payer: MEDICARE

## 2024-07-10 DIAGNOSIS — H35.413: ICD-10-CM

## 2024-07-10 DIAGNOSIS — H16.223: ICD-10-CM

## 2024-07-10 DIAGNOSIS — H43.811: ICD-10-CM

## 2024-07-10 DIAGNOSIS — H25.13: ICD-10-CM

## 2024-07-10 DIAGNOSIS — H40.003: ICD-10-CM

## 2024-07-10 DIAGNOSIS — H35.372: ICD-10-CM

## 2024-07-10 DIAGNOSIS — H02.835: ICD-10-CM

## 2024-07-10 DIAGNOSIS — H43.392: ICD-10-CM

## 2024-07-10 DIAGNOSIS — H02.832: ICD-10-CM

## 2024-07-10 DIAGNOSIS — H02.834: ICD-10-CM

## 2024-07-10 DIAGNOSIS — H02.831: ICD-10-CM

## 2024-07-10 PROCEDURE — 99213 OFFICE O/P EST LOW 20 MIN: CPT | Performed by: OPHTHALMOLOGY

## 2024-07-10 ASSESSMENT — LID POSITION - DERMATOCHALASIS
OS_DERMATOCHALASIS: 1+
OD_DERMATOCHALASIS: 1+

## 2024-07-10 ASSESSMENT — CONFRONTATIONAL VISUAL FIELD TEST (CVF)
OS_FINDINGS: FULL
OD_FINDINGS: FULL

## 2024-12-05 ENCOUNTER — OFFICE (OUTPATIENT)
Dept: URBAN - METROPOLITAN AREA CLINIC 109 | Facility: CLINIC | Age: 69
Setting detail: OPHTHALMOLOGY
End: 2024-12-05
Payer: MEDICARE

## 2024-12-05 DIAGNOSIS — H40.003: ICD-10-CM

## 2024-12-05 DIAGNOSIS — H02.834: ICD-10-CM

## 2024-12-05 DIAGNOSIS — H02.831: ICD-10-CM

## 2024-12-05 DIAGNOSIS — H43.811: ICD-10-CM

## 2024-12-05 DIAGNOSIS — H02.835: ICD-10-CM

## 2024-12-05 DIAGNOSIS — H35.372: ICD-10-CM

## 2024-12-05 DIAGNOSIS — H25.13: ICD-10-CM

## 2024-12-05 DIAGNOSIS — H43.392: ICD-10-CM

## 2024-12-05 DIAGNOSIS — H35.413: ICD-10-CM

## 2024-12-05 DIAGNOSIS — H16.223: ICD-10-CM

## 2024-12-05 DIAGNOSIS — H02.832: ICD-10-CM

## 2024-12-05 PROCEDURE — 92020 GONIOSCOPY: CPT | Performed by: OPHTHALMOLOGY

## 2024-12-05 PROCEDURE — 92014 COMPRE OPH EXAM EST PT 1/>: CPT | Performed by: OPHTHALMOLOGY

## 2024-12-05 PROCEDURE — 92250 FUNDUS PHOTOGRAPHY W/I&R: CPT | Performed by: OPHTHALMOLOGY

## 2024-12-05 PROCEDURE — 92083 EXTENDED VISUAL FIELD XM: CPT | Performed by: OPHTHALMOLOGY

## 2024-12-05 ASSESSMENT — PACHYMETRY
OD_CT_UM: 528
OS_CT_UM: 530
OS_CT_CORRECTION: 1
OD_CT_CORRECTION: 1

## 2024-12-05 ASSESSMENT — REFRACTION_MANIFEST
OD_VA1: 20/30-2
OS_VA1: 20/30-2
OS_VA1: 20/50-3
OD_ADD: +2.25
OD_SPHERE: +1.25
OS_SPHERE: +1.25
OS_SPHERE: -0.25
OS_ADD: +2.25
OD_VA1: 20/25-
OD_SPHERE: +0.75

## 2024-12-05 ASSESSMENT — REFRACTION_AUTOREFRACTION
OS_CYLINDER: -2.25
OD_SPHERE: +2.50
OS_SPHERE: +2.00
OD_CYLINDER: -0.50
OD_AXIS: 130
OS_AXIS: 70

## 2024-12-05 ASSESSMENT — TONOMETRY
OD_IOP_MMHG: 15
OS_IOP_MMHG: 15

## 2024-12-05 ASSESSMENT — VISUAL ACUITY
OD_BCVA: 20/40
OS_BCVA: 20/50+2

## 2024-12-05 ASSESSMENT — LID POSITION - DERMATOCHALASIS
OS_DERMATOCHALASIS: 1+
OD_DERMATOCHALASIS: 1+

## 2024-12-05 ASSESSMENT — CONFRONTATIONAL VISUAL FIELD TEST (CVF)
OD_FINDINGS: FULL
OS_FINDINGS: FULL

## 2024-12-05 ASSESSMENT — SUPERFICIAL PUNCTATE KERATITIS (SPK)
OD_SPK: T 1+
OS_SPK: T 1+

## 2025-01-29 ENCOUNTER — OFFICE (OUTPATIENT)
Dept: URBAN - METROPOLITAN AREA CLINIC 109 | Facility: CLINIC | Age: 70
Setting detail: OPHTHALMOLOGY
End: 2025-01-29
Payer: MEDICARE

## 2025-01-29 DIAGNOSIS — H43.811: ICD-10-CM

## 2025-01-29 DIAGNOSIS — H43.392: ICD-10-CM

## 2025-01-29 DIAGNOSIS — H35.413: ICD-10-CM

## 2025-01-29 DIAGNOSIS — H02.832: ICD-10-CM

## 2025-01-29 DIAGNOSIS — H35.372: ICD-10-CM

## 2025-01-29 DIAGNOSIS — H25.13: ICD-10-CM

## 2025-01-29 DIAGNOSIS — H16.223: ICD-10-CM

## 2025-01-29 DIAGNOSIS — H02.834: ICD-10-CM

## 2025-01-29 DIAGNOSIS — H02.835: ICD-10-CM

## 2025-01-29 DIAGNOSIS — H02.831: ICD-10-CM

## 2025-01-29 DIAGNOSIS — H40.013: ICD-10-CM

## 2025-01-29 PROCEDURE — 99214 OFFICE O/P EST MOD 30 MIN: CPT | Performed by: OPHTHALMOLOGY

## 2025-01-29 ASSESSMENT — CONFRONTATIONAL VISUAL FIELD TEST (CVF)
OS_FINDINGS: FULL
OD_FINDINGS: FULL

## 2025-01-29 ASSESSMENT — REFRACTION_MANIFEST
OS_ADD: +2.25
OD_ADD: +2.25
OD_VA1: 20/NI
OS_VA1: 20/NI
OD_SPHERE: +1.00
OS_SPHERE: +1.00

## 2025-01-29 ASSESSMENT — REFRACTION_AUTOREFRACTION
OS_AXIS: 68
OS_SPHERE: +2.75
OS_CYLINDER: -2.50
OD_CYLINDER: -0.75
OD_AXIS: 144
OD_SPHERE: +2.75

## 2025-01-29 ASSESSMENT — KERATOMETRY
OS_K1POWER_DIOPTERS: 44.75
OS_AXISANGLE_DEGREES: 090
OD_K1POWER_DIOPTERS: 45.00
OD_AXISANGLE_DEGREES: 090
OS_K2POWER_DIOPTERS: 44.75
OD_K2POWER_DIOPTERS: 45.00

## 2025-01-29 ASSESSMENT — VISUAL ACUITY
OD_BCVA: 20/60
OS_BCVA: 20/50

## 2025-01-29 ASSESSMENT — PACHYMETRY
OD_CT_UM: 528
OS_CT_UM: 530
OD_CT_CORRECTION: 1
OS_CT_CORRECTION: 1

## 2025-01-29 ASSESSMENT — LID POSITION - DERMATOCHALASIS
OS_DERMATOCHALASIS: 1+
OD_DERMATOCHALASIS: 1+

## 2025-01-29 ASSESSMENT — TONOMETRY
OD_IOP_MMHG: 17
OS_IOP_MMHG: 17

## 2025-01-29 ASSESSMENT — SUPERFICIAL PUNCTATE KERATITIS (SPK)
OD_SPK: T 1+
OS_SPK: T 1+

## 2025-04-02 ENCOUNTER — OFFICE (OUTPATIENT)
Dept: URBAN - METROPOLITAN AREA CLINIC 109 | Facility: CLINIC | Age: 70
Setting detail: OPHTHALMOLOGY
End: 2025-04-02
Payer: MEDICARE

## 2025-04-02 DIAGNOSIS — H40.013: ICD-10-CM

## 2025-04-02 DIAGNOSIS — H25.12: ICD-10-CM

## 2025-04-02 DIAGNOSIS — H25.13: ICD-10-CM

## 2025-04-02 DIAGNOSIS — H35.372: ICD-10-CM

## 2025-04-02 PROCEDURE — 92136 OPHTHALMIC BIOMETRY: CPT | Mod: 26,LT | Performed by: OPHTHALMOLOGY

## 2025-04-02 PROCEDURE — 92134 CPTRZ OPH DX IMG PST SGM RTA: CPT | Performed by: OPHTHALMOLOGY

## 2025-04-02 PROCEDURE — 99213 OFFICE O/P EST LOW 20 MIN: CPT | Performed by: OPHTHALMOLOGY

## 2025-04-02 PROCEDURE — 92136 OPHTHALMIC BIOMETRY: CPT | Mod: TC | Performed by: OPHTHALMOLOGY

## 2025-04-02 ASSESSMENT — KERATOMETRY
OD_AXISANGLE_DEGREES: 090
OS_CYLPOWER_DEGREES: 0.5
OS_AXISANGLE2_DEGREES: 176
OD_K1POWER_DIOPTERS: 45.00
OS_AXISANGLE_DEGREES: 176
OS_AXISANGLE_DEGREES: 176
OD_K1POWER_DIOPTERS: 45.00
OD_K2POWER_DIOPTERS: 45.00
OD_K1K2_AVERAGE: 45
OD_K2POWER_DIOPTERS: 45.00
OS_K1K2_AVERAGE: 44.75
OS_CYLAXISANGLE_DEGREES: 176
OD_AXISANGLE_DEGREES: 090
OS_K2POWER_DIOPTERS: 45.00
OD_AXISANGLE2_DEGREES: 090
OS_K1POWER_DIOPTERS: 44.5
OS_K2POWER_DIOPTERS: 45.00
OD_CYLAXISANGLE_DEGREES: 90
OS_K1POWER_DIOPTERS: 44.5

## 2025-04-02 ASSESSMENT — PACHYMETRY
OD_CT_UM: 528
OS_CT_CORRECTION: 1
OD_CT_CORRECTION: 1
OS_CT_UM: 530

## 2025-04-02 ASSESSMENT — REFRACTION_AUTOREFRACTION
OD_SPHERE: +2.50
OS_CYLINDER: -2.25
OS_AXIS: 84
OD_CYLINDER: -0.50
OD_AXIS: 140
OS_SPHERE: +1.25

## 2025-04-02 ASSESSMENT — REFRACTION_MANIFEST
OD_SPHERE: +1.00
OD_ADD: +2.25
OS_SPHERE: +1.00
OS_VA1: 20/NI
OD_VA1: 20/NI
OS_ADD: +2.25

## 2025-04-02 ASSESSMENT — LID POSITION - DERMATOCHALASIS
OS_DERMATOCHALASIS: 1+
OD_DERMATOCHALASIS: 1+

## 2025-04-02 ASSESSMENT — TONOMETRY
OS_IOP_MMHG: 17
OD_IOP_MMHG: 17

## 2025-04-02 ASSESSMENT — CONFRONTATIONAL VISUAL FIELD TEST (CVF)
OS_FINDINGS: FULL
OD_FINDINGS: FULL

## 2025-04-02 ASSESSMENT — VISUAL ACUITY
OD_BCVA: 20/60
OS_BCVA: 20/50

## 2025-04-02 ASSESSMENT — SUPERFICIAL PUNCTATE KERATITIS (SPK)
OS_SPK: T 1+
OD_SPK: T 1+

## 2025-04-15 ENCOUNTER — AMBULATORY SURGERY CENTER (OUTPATIENT)
Dept: URBAN - METROPOLITAN AREA SURGERY 19 | Facility: SURGERY | Age: 70
Setting detail: OPHTHALMOLOGY
End: 2025-04-15
Payer: MEDICARE

## 2025-04-15 DIAGNOSIS — H25.12: ICD-10-CM

## 2025-04-15 PROCEDURE — 66984 XCAPSL CTRC RMVL W/O ECP: CPT | Mod: LT | Performed by: OPHTHALMOLOGY

## 2025-04-16 ENCOUNTER — RX ONLY (RX ONLY)
Age: 70
End: 2025-04-16

## 2025-04-16 ENCOUNTER — OFFICE (OUTPATIENT)
Dept: URBAN - METROPOLITAN AREA CLINIC 109 | Facility: CLINIC | Age: 70
Setting detail: OPHTHALMOLOGY
End: 2025-04-16
Payer: MEDICARE

## 2025-04-16 DIAGNOSIS — Z96.1: ICD-10-CM

## 2025-04-16 DIAGNOSIS — H40.013: ICD-10-CM

## 2025-04-16 DIAGNOSIS — H25.11: ICD-10-CM

## 2025-04-16 PROCEDURE — 99024 POSTOP FOLLOW-UP VISIT: CPT | Performed by: OPTOMETRIST

## 2025-04-16 ASSESSMENT — KERATOMETRY
OD_K1POWER_DIOPTERS: 45.00
OD_AXISANGLE_DEGREES: 090
OS_K1POWER_DIOPTERS: 44.5
OS_K2POWER_DIOPTERS: 45.00
OS_AXISANGLE_DEGREES: 176
OD_K2POWER_DIOPTERS: 45.00

## 2025-04-16 ASSESSMENT — PACHYMETRY
OS_CT_UM: 530
OD_CT_UM: 528
OD_CT_CORRECTION: 1
OS_CT_CORRECTION: 1

## 2025-04-16 ASSESSMENT — LID POSITION - DERMATOCHALASIS
OD_DERMATOCHALASIS: 1+
OS_DERMATOCHALASIS: 1+

## 2025-04-16 ASSESSMENT — VISUAL ACUITY
OS_BCVA: 20/50
OD_BCVA: 20/20-3

## 2025-04-16 ASSESSMENT — REFRACTION_MANIFEST
OD_ADD: +2.25
OS_SPHERE: +1.00
OD_VA1: 20/NI
OD_SPHERE: +1.00
OS_VA1: 20/NI
OS_ADD: +2.25

## 2025-04-16 ASSESSMENT — REFRACTION_AUTOREFRACTION
OD_CYLINDER: -0.50
OS_AXIS: 84
OS_CYLINDER: -2.25
OD_SPHERE: +2.50
OS_SPHERE: +1.25
OD_AXIS: 140

## 2025-04-16 ASSESSMENT — SUPERFICIAL PUNCTATE KERATITIS (SPK)
OS_SPK: T 1+
OD_SPK: T 1+

## 2025-05-07 ENCOUNTER — OFFICE (OUTPATIENT)
Dept: URBAN - METROPOLITAN AREA CLINIC 109 | Facility: CLINIC | Age: 70
Setting detail: OPHTHALMOLOGY
End: 2025-05-07
Payer: MEDICARE

## 2025-05-07 DIAGNOSIS — H25.11: ICD-10-CM

## 2025-05-07 DIAGNOSIS — Z96.1: ICD-10-CM

## 2025-05-07 PROCEDURE — 92136 OPHTHALMIC BIOMETRY: CPT | Mod: 26,RT | Performed by: OPHTHALMOLOGY

## 2025-05-07 PROCEDURE — 99024 POSTOP FOLLOW-UP VISIT: CPT | Performed by: OPHTHALMOLOGY

## 2025-05-07 ASSESSMENT — KERATOMETRY
OS_K1POWER_DIOPTERS: 44.50
OS_K2POWER_DIOPTERS: 44.75
OD_K2POWER_DIOPTERS: 45.25
OD_K1POWER_DIOPTERS: 44.75
OD_AXISANGLE_DEGREES: 120
OS_AXISANGLE_DEGREES: 004

## 2025-05-07 ASSESSMENT — CONFRONTATIONAL VISUAL FIELD TEST (CVF)
OD_FINDINGS: FULL
OS_FINDINGS: FULL

## 2025-05-07 ASSESSMENT — REFRACTION_MANIFEST
OS_ADD: +2.25
OS_SPHERE: +1.00
OS_VA1: 20/NI
OD_SPHERE: +1.00
OD_ADD: +2.25
OD_VA1: 20/NI

## 2025-05-07 ASSESSMENT — PACHYMETRY
OD_CT_UM: 528
OS_CT_UM: 530
OS_CT_CORRECTION: 1
OD_CT_CORRECTION: 1

## 2025-05-07 ASSESSMENT — REFRACTION_AUTOREFRACTION
OS_SPHERE: +0.50
OS_CYLINDER: -1.00
OS_AXIS: 088
OD_SPHERE: ERR

## 2025-05-07 ASSESSMENT — SUPERFICIAL PUNCTATE KERATITIS (SPK)
OD_SPK: T 1+
OS_SPK: T 1+

## 2025-05-07 ASSESSMENT — VISUAL ACUITY
OD_BCVA: 20/25-1
OS_BCVA: 20/50

## 2025-05-07 ASSESSMENT — TONOMETRY
OS_IOP_MMHG: 13
OD_IOP_MMHG: 15

## 2025-05-07 ASSESSMENT — LID POSITION - DERMATOCHALASIS
OS_DERMATOCHALASIS: 1+
OD_DERMATOCHALASIS: 1+

## 2025-05-15 ENCOUNTER — AMBULATORY SURGERY CENTER (OUTPATIENT)
Dept: URBAN - METROPOLITAN AREA SURGERY 19 | Facility: SURGERY | Age: 70
Setting detail: OPHTHALMOLOGY
End: 2025-05-15
Payer: MEDICARE

## 2025-05-15 DIAGNOSIS — H25.11: ICD-10-CM

## 2025-05-15 PROCEDURE — 66984 XCAPSL CTRC RMVL W/O ECP: CPT | Mod: 79,RT | Performed by: OPHTHALMOLOGY

## 2025-05-16 ENCOUNTER — OFFICE (OUTPATIENT)
Dept: URBAN - METROPOLITAN AREA CLINIC 109 | Facility: CLINIC | Age: 70
Setting detail: OPHTHALMOLOGY
End: 2025-05-16
Payer: MEDICARE

## 2025-05-16 DIAGNOSIS — Z96.1: ICD-10-CM

## 2025-05-16 PROCEDURE — 99024 POSTOP FOLLOW-UP VISIT: CPT | Performed by: OPTOMETRIST

## 2025-05-16 ASSESSMENT — PACHYMETRY
OD_CT_UM: 528
OS_CT_CORRECTION: 1
OS_CT_UM: 530
OD_CT_CORRECTION: 1

## 2025-05-16 ASSESSMENT — KERATOMETRY
OS_K1POWER_DIOPTERS: 44.50
OS_K2POWER_DIOPTERS: 44.75
OD_AXISANGLE_DEGREES: 120
OD_K2POWER_DIOPTERS: 45.25
OD_K1POWER_DIOPTERS: 44.75
OS_AXISANGLE_DEGREES: 004

## 2025-05-16 ASSESSMENT — REFRACTION_AUTOREFRACTION
OS_CYLINDER: -1.00
OS_SPHERE: +0.50
OS_AXIS: 088
OD_SPHERE: ERR

## 2025-05-16 ASSESSMENT — REFRACTION_MANIFEST
OD_ADD: +2.25
OD_SPHERE: +1.00
OS_ADD: +2.25
OS_SPHERE: +1.00
OS_VA1: 20/NI
OD_VA1: 20/NI

## 2025-05-16 ASSESSMENT — VISUAL ACUITY
OD_BCVA: 20/25-1
OS_BCVA: 20/20

## 2025-05-16 ASSESSMENT — TONOMETRY
OS_IOP_MMHG: 15
OD_IOP_MMHG: 17

## 2025-05-16 ASSESSMENT — LID POSITION - DERMATOCHALASIS
OS_DERMATOCHALASIS: 1+
OD_DERMATOCHALASIS: 1+

## 2025-05-16 ASSESSMENT — CONFRONTATIONAL VISUAL FIELD TEST (CVF)
OS_FINDINGS: FULL
OD_FINDINGS: FULL

## 2025-05-16 ASSESSMENT — SUPERFICIAL PUNCTATE KERATITIS (SPK)
OS_SPK: T 1+
OD_SPK: T 1+

## 2025-06-03 ENCOUNTER — OFFICE (OUTPATIENT)
Dept: URBAN - METROPOLITAN AREA CLINIC 109 | Facility: CLINIC | Age: 70
Setting detail: OPHTHALMOLOGY
End: 2025-06-03
Payer: MEDICARE

## 2025-06-03 DIAGNOSIS — Z96.1: ICD-10-CM

## 2025-06-03 PROCEDURE — 99024 POSTOP FOLLOW-UP VISIT: CPT | Performed by: OPTOMETRIST

## 2025-06-03 ASSESSMENT — KERATOMETRY
OS_AXISANGLE_DEGREES: 004
OD_K1POWER_DIOPTERS: 44.75
OD_AXISANGLE_DEGREES: 120
OD_K2POWER_DIOPTERS: 45.25
OS_K1POWER_DIOPTERS: 44.50
OS_K2POWER_DIOPTERS: 44.75

## 2025-06-03 ASSESSMENT — REFRACTION_AUTOREFRACTION
OD_AXIS: 73
OS_CYLINDER: -1.00
OD_CYLINDER: -1.00
OS_AXIS: 089
OS_SPHERE: +0.50
OD_SPHERE: +0.25

## 2025-06-03 ASSESSMENT — REFRACTION_MANIFEST
OD_VA1: 20/NI
OS_VA1: 20/NI
OD_ADD: +2.25
OS_ADD: +2.25
OD_SPHERE: +1.00
OS_SPHERE: +1.00

## 2025-06-03 ASSESSMENT — PACHYMETRY
OS_CT_UM: 530
OD_CT_CORRECTION: 1
OD_CT_UM: 528
OS_CT_CORRECTION: 1

## 2025-06-03 ASSESSMENT — VISUAL ACUITY
OD_BCVA: 20/20
OS_BCVA: 20/20

## 2025-06-03 ASSESSMENT — LID POSITION - DERMATOCHALASIS
OS_DERMATOCHALASIS: 1+
OD_DERMATOCHALASIS: 1+

## 2025-06-03 ASSESSMENT — SUPERFICIAL PUNCTATE KERATITIS (SPK)
OS_SPK: T 1+
OD_SPK: T 1+